# Patient Record
Sex: MALE | Race: WHITE | NOT HISPANIC OR LATINO | Employment: OTHER | URBAN - METROPOLITAN AREA
[De-identification: names, ages, dates, MRNs, and addresses within clinical notes are randomized per-mention and may not be internally consistent; named-entity substitution may affect disease eponyms.]

---

## 2021-04-09 ENCOUNTER — AMBULATORY - HEALTHEAST (OUTPATIENT)
Dept: SURGERY | Facility: CLINIC | Age: 56
End: 2021-04-09

## 2021-04-09 DIAGNOSIS — Z11.59 ENCOUNTER FOR SCREENING FOR OTHER VIRAL DISEASES: ICD-10-CM

## 2021-04-12 ENCOUNTER — RECORDS - HEALTHEAST (OUTPATIENT)
Dept: ADMINISTRATIVE | Facility: OTHER | Age: 56
End: 2021-04-12

## 2021-04-21 ENCOUNTER — RECORDS - HEALTHEAST (OUTPATIENT)
Dept: ADMINISTRATIVE | Facility: OTHER | Age: 56
End: 2021-04-21

## 2022-08-19 ENCOUNTER — TRANSFERRED RECORDS (OUTPATIENT)
Dept: HEALTH INFORMATION MANAGEMENT | Facility: CLINIC | Age: 57
End: 2022-08-19

## 2022-09-21 ENCOUNTER — TRANSFERRED RECORDS (OUTPATIENT)
Dept: HEALTH INFORMATION MANAGEMENT | Facility: CLINIC | Age: 57
End: 2022-09-21

## 2022-09-22 ENCOUNTER — REFERRAL (OUTPATIENT)
Dept: TRANSPLANT | Facility: CLINIC | Age: 57
End: 2022-09-22

## 2022-09-22 DIAGNOSIS — N18.5 CHRONIC KIDNEY DISEASE, STAGE V (H): ICD-10-CM

## 2022-09-22 DIAGNOSIS — Z01.818 PRE-TRANSPLANT EVALUATION FOR KIDNEY TRANSPLANT: ICD-10-CM

## 2022-09-22 DIAGNOSIS — N18.6 ESRD (END STAGE RENAL DISEASE) (H): Primary | ICD-10-CM

## 2022-09-22 DIAGNOSIS — I10 ESSENTIAL HYPERTENSION: ICD-10-CM

## 2022-09-22 DIAGNOSIS — Z87.891 HISTORY OF TOBACCO USE: ICD-10-CM

## 2022-09-22 DIAGNOSIS — E11.9 DIABETES MELLITUS, TYPE 2 (H): ICD-10-CM

## 2022-09-22 DIAGNOSIS — Z01.818 ENCOUNTER FOR PRE-TRANSPLANT EVALUATION FOR KIDNEY AND PANCREAS TRANSPLANT: ICD-10-CM

## 2022-09-22 NOTE — LETTER
October 25, 2022      Emmanuel Barnett  [address] [facility] [address] [facility] [address]      Dear Emmanuel,    Thank you for your interest in the Transplant Center at [facility]. We look forward to being a part of your care team and assisting you through the transplant process.    As we discussed, your transplant coordinator is Colleen Chow (Pancreas, Kidney).  You may call your coordinator at any time with questions or concerns. [phone].    Please complete the following.    1. Fill out and return the enclosed forms    Authorization for Electronic Communication    Authorization to Discuss Protected Health Information    Authorization for Release of Protected Health Information    2. Sign up for:    [employer], access to your electronic medical record (see enclosed pamphlet)    [email or web address] a transplant education website    You can use these tools to learn more about your transplant, communicate with your care team, and track your medical details      Sincerely,      Solid Organ Transplant  [facility]    cc: Referring Physician PCP

## 2022-10-03 ENCOUNTER — DOCUMENTATION ONLY (OUTPATIENT)
Dept: TRANSPLANT | Facility: CLINIC | Age: 57
End: 2022-10-03

## 2022-10-03 VITALS — HEIGHT: 68 IN | WEIGHT: 196.21 LBS | BODY MASS INDEX: 29.74 KG/M2

## 2022-10-03 NOTE — TELEPHONE ENCOUNTER
PCP: Iliana Anderson   Referring Provider: Fiorella Olsen  Referring Diagnosis: ESRD  Hx: DM Type 2, Htn    Is patient under the age of 65? yes  Is patient diabetic? yes  Is patient on insulin? yes  Was patient offered a pancreas transplant referral? yes    Is patient in a group home/assisted living? no  Does patient have a guardian? no    Referral intake process completed.  Patient is aware that after financial approval is received, medical records will be requested.   Patient confirmed for a callback from transplant coordinator on October 11, 2022. (within 2 weeks)  Tentative evaluation date November 17, 2022 (within 4 weeks) if appointment is virtual, does patient have capabilities of setting this up?     Confirmed coordinator will discuss evaluation process in more detail at the time of their call.   Patient is aware of the need to arrange age appropriate cancer screening, vaccinations, and dental care.  Reminded patient to complete questionnaire, complete medical records release, and review packet prior to evaluation visit .  Assessed patient for special needs (ie-wheelchair, assistance, guardian, and ):  uses a wheelchair or walker   Patient instructed to call [phone] with questions.     Patient gave verbal consent during intake call to obtain medical records and documents outside of [facility]:  yes

## 2022-10-05 ENCOUNTER — DOCUMENTATION ONLY (OUTPATIENT)
Dept: TRANSPLANT | Facility: CLINIC | Age: 57
End: 2022-10-05

## 2022-10-11 ENCOUNTER — DOCUMENTATION ONLY (OUTPATIENT)
Dept: TRANSPLANT | Facility: CLINIC | Age: 57
End: 2022-10-11

## 2022-10-11 NOTE — TELEPHONE ENCOUNTER
Reviewed pt's chart for pre-kidney/pancreas transplant evaluation planning. Pt lives in [place], [place]. Referred to our center by Dr. Olsen. Pt has ESRD due to type 2 diabetes and hypertension. Pt has been on dialysis for over 3 years. He was evaluated at [facility] in 2019 and was asked to lose weight (BMI was 38) and lower A1C. Pt is a type 2 diabetic, last A1C 15 in February 2022. Other hx includes diabetic foot ulcers, depression. He was referred to [facility] for transplant evaluation, but had an eventful last 2 years with health complications: 1/2021 admission for perianal abcess with necrotic tissue and soft tissue infection, requiring SNF stay, shortly after discharge he was admitted for pneumonia, then osteomyelitis of his foot 5/2021, and COVID 12/2021. Admitted 2/2022 after a fall resulting in large subdural hematoma with intraparenchymal hemorrhage, s/p left middle meningeal artery embolization 4/7/22; had followed with neurosurgery. Heart hx: NSTEMI with complete occlusion of LCx s/p PCI x1 in 2017; PCI x3 in March 2019, x2 in April 2019, continues to follow with cardiology through [facility].  Lung status: MICHEAL on CPAP. Surgical hx includes: as above, tonsillectomy/adenoidectomy. BMI 30 on 6/29/22.  Discussed BMI requirement for transplant with patient: yes. Has never had a colonoscopy.  Dental: encouraged to update. Pt is a smoker, does not consume alcohol, and dies not use recreational drugs. Pt is  w/ ADLs.  Pt lives alone and has support in his daughter and ex-wife following transplant.     I also introduced [employer] and asked pt to create an account and view pre-kidney transplant videos for review with me following evaluation. Confirmed STD 11/17.

## 2022-11-10 ENCOUNTER — TELEPHONE (OUTPATIENT)
Dept: TRANSPLANT | Facility: CLINIC | Age: 57
End: 2022-11-10

## 2022-11-10 NOTE — TELEPHONE ENCOUNTER
.VM message left reminding patient of upcoming PKE appointments at [facility] on 11/17/22 starting at 0730. Instructed patient may eat breakfast, take regularly scheduled medications and be accompanied by 1 adult visitor.   Contact information given for any further questions/concerns/need to reschedule.

## 2022-11-15 NOTE — PROGRESS NOTES
"[facility] Solid Organ Transplant  Outpatient MNT: Kidney Pancreas Transplant Evaluation    Current BMI: 30.86 (HT 67.5 in,  lbs/91 kg)  BMI is within recommendation of <35 for KP transplant    Frailty Assessment -- Frail (3/5 points)- unintentional wt loss, reduced , low activity level     Recommended Interventions to Address Frailty:  - Continue PT  - Foot wound needs to heal; better BG control & higher protein intake      Time Spent: 15 minutes  Visit Type: Initial   Referring Physician: Finger   Pt accompanied by: his friend, Yudith (RN)    History of previous txp: none   Dialysis: yes    Dialysis Info: HD 2/2019 6 am   Protein supplement: none     Nutrition Assessment  H/o DM II. Reports he has a CGM, but hasn't been wearing it much lately d/t many CTs/scans that require removal of it. Now he is checking his BG maybe 1-2x/day. He reports his A1c is higher than before d/t not taking insulin or managing DM as well. Last A1c 15.6 in Feb.     Pt has open foot wound (for a few years now) and report it is \"almost healed\".     - Appetite: baseline   - Food allergies/intolerances: no   - Meal prep & grocery shopping: pt does   - Previous RD education: yes  - Issues chewing or swallowing: no   - N/V/D/C: occasional diarrhea   - Food access concerns: somewhat; ?[facility] in the past     Vitamins, Supplements, Pertinent Meds: calcium with vit D  Herbal Medicines/Supplements: none     Edema: occasional FREDDY     Weight hx:   - pt reports he has lost 45 lbs x 1 year- unintentional- change in lifestyle VS hyperglycemia; ?stable now   - wt shows down ~20 lbs x 1 year per CE    Diet Recall  Breakfast None- baseline     Lunch S/w with bologna or ham    Dinner Homemade goulash; pork chop w/ green beans (frozen) + occasional potato    Snacks Occasional granola bar    Beverages 32 oz FR- water, milk (12-16 oz/day), soda (Coke1-3/day), occasional juice/Gatorade; Premier Protein (30 g/day) 1 daily at the most "   Alcohol None    Dining out 2x/week      Physical Activity  PT- started 1 month ago; has done in the past but more helpful this time around   Trying to walk more w/o walker  Walks from house to detached garage w/o walker  Walker x 2.5 years - balance- open sore on foot     Labs  In August, K wnl and Phos high     Nutrition Diagnosis  No nutrition diagnosis identified at this time.     Nutrition Intervention  Nutrition education provided:  Discussed sodium intake (low sodium foods and drinks, seasoning food without salt and tips for low sodium diet).  Reviewed K levels, elevated phos levels. Reviewed recommendation to eliminate milk & soda intake to help with phosphorus. He is also eating a fair amount of processed foods, which will also impact phos level.     Reviewed high A1c and how this is impacting wound healing. We discussed how high protein intake also helps w/ wound healing. Reviewed need for improvement in A1c.     Reviewed post txp diet guidelines in brief (will review in further detail post txp):  (1) Review of proper food safety measures d/t immunosuppressant therapy post-op and increased risk for food-borne illness    (2) Avoid the following post txp d/t risk for rejection, unknown effects on the organs, and/or potential interactions with immunosuppressants:  - Herbal, Chinese, holistic, chiropractic, natural, alternative medicines and supplements  - Detoxes and cleanses  - Weight loss pills  - Protein powders or other products with extracts or herbs (ie green tea extract)    (3) Med regimen and possible side effects    Patient Understanding: Pt verbalized understanding of education provided.  Expected Engagement: Fair  Follow-Up Plans: PRN     Nutrition Goals  No nutrition goals identified at this time     Rosa Leal, RD, LD, CCTD

## 2022-11-16 LAB
ABO/RH(D): NORMAL
ANTIBODY SCREEN: NEGATIVE
SPECIMEN EXPIRATION DATE: NORMAL

## 2022-11-17 ENCOUNTER — ALLIED HEALTH/NURSE VISIT (OUTPATIENT)
Dept: TRANSPLANT | Facility: CLINIC | Age: 57
End: 2022-11-17
Attending: NURSE PRACTITIONER
Payer: MEDICARE

## 2022-11-17 ENCOUNTER — APPOINTMENT (OUTPATIENT)
Dept: TRANSPLANT | Facility: CLINIC | Age: 57
End: 2022-11-17
Attending: INTERNAL MEDICINE
Payer: MEDICARE

## 2022-11-17 ENCOUNTER — LAB (OUTPATIENT)
Dept: LAB | Facility: CLINIC | Age: 57
End: 2022-11-17
Attending: NURSE PRACTITIONER
Payer: MEDICARE

## 2022-11-17 ENCOUNTER — ANCILLARY PROCEDURE (OUTPATIENT)
Dept: CARDIOLOGY | Facility: CLINIC | Age: 57
End: 2022-11-17
Attending: NURSE PRACTITIONER
Payer: MEDICARE

## 2022-11-17 ENCOUNTER — ANCILLARY PROCEDURE (OUTPATIENT)
Dept: GENERAL RADIOLOGY | Facility: CLINIC | Age: 57
End: 2022-11-17
Attending: NURSE PRACTITIONER
Payer: MEDICARE

## 2022-11-17 ENCOUNTER — DOCUMENTATION ONLY (OUTPATIENT)
Dept: TRANSPLANT | Facility: CLINIC | Age: 57
End: 2022-11-17

## 2022-11-17 VITALS
HEART RATE: 72 BPM | DIASTOLIC BLOOD PRESSURE: 69 MMHG | OXYGEN SATURATION: 99 % | WEIGHT: 200 LBS | SYSTOLIC BLOOD PRESSURE: 161 MMHG | BODY MASS INDEX: 30.86 KG/M2

## 2022-11-17 DIAGNOSIS — I10 ESSENTIAL HYPERTENSION: ICD-10-CM

## 2022-11-17 DIAGNOSIS — N18.6 ESRD (END STAGE RENAL DISEASE) (H): ICD-10-CM

## 2022-11-17 DIAGNOSIS — N18.5 CHRONIC KIDNEY DISEASE, STAGE V (H): ICD-10-CM

## 2022-11-17 DIAGNOSIS — E11.69 TYPE 2 DIABETES MELLITUS WITH OTHER SPECIFIED COMPLICATION, WITH LONG-TERM CURRENT USE OF INSULIN (H): ICD-10-CM

## 2022-11-17 DIAGNOSIS — E11.9 DIABETES MELLITUS, TYPE 2 (H): ICD-10-CM

## 2022-11-17 DIAGNOSIS — Z99.2 TYPE 2 DIABETES MELLITUS WITH CHRONIC KIDNEY DISEASE ON CHRONIC DIALYSIS, WITH LONG-TERM CURRENT USE OF INSULIN (H): ICD-10-CM

## 2022-11-17 DIAGNOSIS — Z01.818 PRE-TRANSPLANT EVALUATION FOR KIDNEY TRANSPLANT: ICD-10-CM

## 2022-11-17 DIAGNOSIS — Z01.818 ENCOUNTER FOR PRE-TRANSPLANT EVALUATION FOR KIDNEY AND PANCREAS TRANSPLANT: ICD-10-CM

## 2022-11-17 DIAGNOSIS — Z87.891 HISTORY OF TOBACCO USE: ICD-10-CM

## 2022-11-17 DIAGNOSIS — N18.6 TYPE 2 DIABETES MELLITUS WITH CHRONIC KIDNEY DISEASE ON CHRONIC DIALYSIS, WITH LONG-TERM CURRENT USE OF INSULIN (H): ICD-10-CM

## 2022-11-17 DIAGNOSIS — Z79.4 TYPE 2 DIABETES MELLITUS WITH CHRONIC KIDNEY DISEASE ON CHRONIC DIALYSIS, WITH LONG-TERM CURRENT USE OF INSULIN (H): ICD-10-CM

## 2022-11-17 DIAGNOSIS — E11.22 TYPE 2 DIABETES MELLITUS WITH CHRONIC KIDNEY DISEASE ON CHRONIC DIALYSIS, WITH LONG-TERM CURRENT USE OF INSULIN (H): ICD-10-CM

## 2022-11-17 DIAGNOSIS — Z79.4 TYPE 2 DIABETES MELLITUS WITH OTHER SPECIFIED COMPLICATION, WITH LONG-TERM CURRENT USE OF INSULIN (H): ICD-10-CM

## 2022-11-17 DIAGNOSIS — Z12.5 ENCOUNTER FOR SCREENING FOR MALIGNANT NEOPLASM OF PROSTATE: ICD-10-CM

## 2022-11-17 LAB
A1 AG RBC QL: POSITIVE
ABO/RH(D): NORMAL
ALBUMIN SERPL BCG-MCNC: 4.2 G/DL (ref 3.5–5.2)
ALP SERPL-CCNC: 161 U/L (ref 40–129)
ALT SERPL W P-5'-P-CCNC: 19 U/L (ref 10–50)
ANION GAP SERPL CALCULATED.3IONS-SCNC: 17 MMOL/L (ref 7–15)
ANTIBODY TITER IGM SCREEN: NEGATIVE
APTT PPP: 31 SECONDS (ref 22–38)
AST SERPL W P-5'-P-CCNC: 16 U/L (ref 10–50)
ATRIAL RATE - MUSE: 78 BPM
B IGG TITR SERPL: 32 {TITER}
B IGM TITR SERPL: 32 {TITER}
BASOPHILS # BLD AUTO: 0 10E3/UL (ref 0–0.2)
BASOPHILS NFR BLD AUTO: 1 %
BILIRUB SERPL-MCNC: 0.4 MG/DL
BUN SERPL-MCNC: 35.5 MG/DL (ref 8–23)
CALCIUM SERPL-MCNC: 9 MG/DL (ref 8.6–10)
CHLORIDE SERPL-SCNC: 90 MMOL/L (ref 98–107)
CREAT SERPL-MCNC: 4.32 MG/DL (ref 0.67–1.17)
DEPRECATED HCO3 PLAS-SCNC: 27 MMOL/L (ref 22–29)
DIASTOLIC BLOOD PRESSURE - MUSE: NORMAL MMHG
EOSINOPHIL # BLD AUTO: 0.1 10E3/UL (ref 0–0.7)
EOSINOPHIL NFR BLD AUTO: 1 %
ERYTHROCYTE [DISTWIDTH] IN BLOOD BY AUTOMATED COUNT: 13 % (ref 10–15)
FACTOR 2 INTERPRETATION: NORMAL
FACTOR V INTERPRETATION: NORMAL
GFR SERPL CREATININE-BSD FRML MDRD: 15 ML/MIN/1.73M2
GLUCOSE SERPL-MCNC: 261 MG/DL (ref 70–99)
HBA1C MFR BLD: 13.2 %
HBV CORE AB SERPL QL IA: NONREACTIVE
HBV SURFACE AB SERPL IA-ACNC: 30.98 M[IU]/ML
HBV SURFACE AB SERPL IA-ACNC: REACTIVE M[IU]/ML
HBV SURFACE AG SERPL QL IA: NONREACTIVE
HCT VFR BLD AUTO: 32.3 % (ref 40–53)
HCV AB SERPL QL IA: NONREACTIVE
HGB BLD-MCNC: 10.9 G/DL (ref 13.3–17.7)
HIV 1+2 AB+HIV1 P24 AG SERPL QL IA: NONREACTIVE
IMM GRANULOCYTES # BLD: 0.1 10E3/UL
IMM GRANULOCYTES NFR BLD: 1 %
INR PPP: 1.16 (ref 0.85–1.15)
INTERPRETATION ECG - MUSE: NORMAL
LAB DIRECTOR COMMENTS: NORMAL
LAB DIRECTOR DISCLAIMER: NORMAL
LAB DIRECTOR INTERPRETATION: NORMAL
LAB DIRECTOR METHODOLOGY: NORMAL
LAB DIRECTOR RESULTS: NORMAL
LVEF ECHO: NORMAL
LYMPHOCYTES # BLD AUTO: 1.2 10E3/UL (ref 0.8–5.3)
LYMPHOCYTES NFR BLD AUTO: 14 %
MCH RBC QN AUTO: 30 PG (ref 26.5–33)
MCHC RBC AUTO-ENTMCNC: 33.7 G/DL (ref 31.5–36.5)
MCV RBC AUTO: 89 FL (ref 78–100)
MONOCYTES # BLD AUTO: 0.6 10E3/UL (ref 0–1.3)
MONOCYTES NFR BLD AUTO: 7 %
NEUTROPHILS # BLD AUTO: 6.3 10E3/UL (ref 1.6–8.3)
NEUTROPHILS NFR BLD AUTO: 76 %
NRBC # BLD AUTO: 0 10E3/UL
NRBC BLD AUTO-RTO: 0 /100
P AXIS - MUSE: 53 DEGREES
PLATELET # BLD AUTO: 331 10E3/UL (ref 150–450)
POTASSIUM SERPL-SCNC: 4.3 MMOL/L (ref 3.4–5.3)
PR INTERVAL - MUSE: 204 MS
PROT SERPL-MCNC: 7.9 G/DL (ref 6.4–8.3)
PSA SERPL-MCNC: 0.73 NG/ML (ref 0–3.5)
QRS DURATION - MUSE: 86 MS
QT - MUSE: 418 MS
QTC - MUSE: 476 MS
R AXIS - MUSE: 11 DEGREES
RBC # BLD AUTO: 3.63 10E6/UL (ref 4.4–5.9)
SODIUM SERPL-SCNC: 134 MMOL/L (ref 136–145)
SPECIMEN DESCRIPTION: NORMAL
SPECIMEN EXPIRATION DATE: NORMAL
SYSTOLIC BLOOD PRESSURE - MUSE: NORMAL MMHG
T AXIS - MUSE: 37 DEGREES
VENTRICULAR RATE- MUSE: 78 BPM
WBC # BLD AUTO: 8.3 10E3/UL (ref 4–11)

## 2022-11-17 PROCEDURE — 87340 HEPATITIS B SURFACE AG IA: CPT | Performed by: PHYSICIAN ASSISTANT

## 2022-11-17 PROCEDURE — 99207 PR NO CHARGE COORDINATED CARE PS: CPT

## 2022-11-17 PROCEDURE — 86901 BLOOD TYPING SEROLOGIC RH(D): CPT | Performed by: PHYSICIAN ASSISTANT

## 2022-11-17 PROCEDURE — 86833 HLA CLASS II HIGH DEFIN QUAL: CPT | Performed by: PHYSICIAN ASSISTANT

## 2022-11-17 PROCEDURE — 93306 TTE W/DOPPLER COMPLETE: CPT | Performed by: INTERNAL MEDICINE

## 2022-11-17 PROCEDURE — 71046 X-RAY EXAM CHEST 2 VIEWS: CPT | Mod: GC | Performed by: RADIOLOGY

## 2022-11-17 PROCEDURE — 85025 COMPLETE CBC W/AUTO DIFF WBC: CPT | Performed by: PATHOLOGY

## 2022-11-17 PROCEDURE — 85730 THROMBOPLASTIN TIME PARTIAL: CPT | Performed by: PATHOLOGY

## 2022-11-17 PROCEDURE — 86905 BLOOD TYPING RBC ANTIGENS: CPT | Performed by: PHYSICIAN ASSISTANT

## 2022-11-17 PROCEDURE — G0103 PSA SCREENING: HCPCS | Mod: GA | Performed by: PATHOLOGY

## 2022-11-17 PROCEDURE — 86886 COOMBS TEST INDIRECT TITER: CPT | Performed by: PHYSICIAN ASSISTANT

## 2022-11-17 PROCEDURE — 81382 HLA II TYPING 1 LOC HR: CPT | Performed by: PHYSICIAN ASSISTANT

## 2022-11-17 PROCEDURE — 99205 OFFICE O/P NEW HI 60 MIN: CPT

## 2022-11-17 PROCEDURE — 80053 COMPREHEN METABOLIC PANEL: CPT | Performed by: PATHOLOGY

## 2022-11-17 PROCEDURE — 86147 CARDIOLIPIN ANTIBODY EA IG: CPT | Performed by: PHYSICIAN ASSISTANT

## 2022-11-17 PROCEDURE — 86706 HEP B SURFACE ANTIBODY: CPT | Performed by: PHYSICIAN ASSISTANT

## 2022-11-17 PROCEDURE — 86850 RBC ANTIBODY SCREEN: CPT | Performed by: PHYSICIAN ASSISTANT

## 2022-11-17 PROCEDURE — 86665 EPSTEIN-BARR CAPSID VCA: CPT | Performed by: PHYSICIAN ASSISTANT

## 2022-11-17 PROCEDURE — 36415 COLL VENOUS BLD VENIPUNCTURE: CPT | Performed by: PATHOLOGY

## 2022-11-17 PROCEDURE — 86780 TREPONEMA PALLIDUM: CPT | Performed by: PHYSICIAN ASSISTANT

## 2022-11-17 PROCEDURE — 86644 CMV ANTIBODY: CPT | Performed by: PHYSICIAN ASSISTANT

## 2022-11-17 PROCEDURE — 86803 HEPATITIS C AB TEST: CPT | Performed by: PHYSICIAN ASSISTANT

## 2022-11-17 PROCEDURE — 84681 ASSAY OF C-PEPTIDE: CPT | Performed by: PHYSICIAN ASSISTANT

## 2022-11-17 PROCEDURE — 85390 FIBRINOLYSINS SCREEN I&R: CPT | Mod: 26 | Performed by: PATHOLOGY

## 2022-11-17 PROCEDURE — 81240 F2 GENE: CPT | Performed by: PHYSICIAN ASSISTANT

## 2022-11-17 PROCEDURE — 86481 TB AG RESPONSE T-CELL SUSP: CPT | Performed by: PHYSICIAN ASSISTANT

## 2022-11-17 PROCEDURE — 85730 THROMBOPLASTIN TIME PARTIAL: CPT | Performed by: PHYSICIAN ASSISTANT

## 2022-11-17 PROCEDURE — 85670 THROMBIN TIME PLASMA: CPT | Performed by: PHYSICIAN ASSISTANT

## 2022-11-17 PROCEDURE — 85610 PROTHROMBIN TIME: CPT | Performed by: PATHOLOGY

## 2022-11-17 PROCEDURE — 83036 HEMOGLOBIN GLYCOSYLATED A1C: CPT | Performed by: PHYSICIAN ASSISTANT

## 2022-11-17 PROCEDURE — 86787 VARICELLA-ZOSTER ANTIBODY: CPT | Performed by: PHYSICIAN ASSISTANT

## 2022-11-17 PROCEDURE — 86704 HEP B CORE ANTIBODY TOTAL: CPT | Performed by: PHYSICIAN ASSISTANT

## 2022-11-17 PROCEDURE — 86832 HLA CLASS I HIGH DEFIN QUAL: CPT | Performed by: PHYSICIAN ASSISTANT

## 2022-11-17 PROCEDURE — G0452 MOLECULAR PATHOLOGY INTERPR: HCPCS | Mod: 26 | Performed by: PATHOLOGY

## 2022-11-17 RX ORDER — VIT B COMP NO.3/FOLIC/C/BIOTIN 1 MG-60 MG
1 TABLET ORAL
COMMUNITY

## 2022-11-17 RX ORDER — CALCIUM CARBONATE 500(1250)
TABLET,CHEWABLE ORAL
COMMUNITY

## 2022-11-17 RX ORDER — SERTRALINE HYDROCHLORIDE 150 MG/1
1 CAPSULE ORAL EVERY 24 HOURS
COMMUNITY
Start: 2022-02-17

## 2022-11-17 RX ORDER — INSULIN DEGLUDEC 100 U/ML
50 INJECTION, SOLUTION SUBCUTANEOUS
COMMUNITY
Start: 2021-04-23

## 2022-11-17 RX ORDER — PREGABALIN 50 MG/1
CAPSULE ORAL
COMMUNITY
Start: 2022-10-19

## 2022-11-17 RX ORDER — NITROGLYCERIN 0.4 MG/1
0.4 TABLET SUBLINGUAL
COMMUNITY
Start: 2022-06-08

## 2022-11-17 RX ORDER — TRAZODONE HYDROCHLORIDE 100 MG/1
100 TABLET ORAL
COMMUNITY
Start: 2021-05-06

## 2022-11-17 NOTE — PROGRESS NOTES
TRANSPLANT NEPHROLOGY RECIPIENT EVALUATION NOTE    Assessment and Plan:  # Kidney/Pancreas Transplant Evaluation: Patient is a fair candidate overall. Benefits of a living donor transplant were discussed.    # ESKD from Presumed Diabetic Nephropathy: doing OK on dialysis since February 2019, but may benefit from a kidney transplant.    # Type 2 Diabetes: currently using short acting insulin 5-12 units with meals and Tresiba 50 units daily. Poorly controlled, A1c 13.2%. His cardiac history may put him at an increased risk for pancreas transplant.     # Cardiac Risk: he will need risk assessment due to history of CAD s/p PCI in 2017, 2019, and Jan 2021 (see last [facility] copied below), HFrEF (40-45%), and moderate-to-severe mitral insufficiency.     # Subdural Hematoma: 2/2 mechanical fall, s/p left middle meningeal artery embolization April 2022. Need to ensure he has had adequate neurosurgery follow up.    # MICHEAL: on CPAP.    # Former Tobacco Use: 30+ pack-year history. Quit 2017. Needs PFTs.    # Abnormal CXR: partially visualized nodular opacity left apex. Will get non-contrast chest CT.     # Right Diabetic Foot Wound: followed by podiatry and appears to be healing well.     # Health Maintenance: will need to ensure colonoscopy and dental are UTD.    Discussed the risks and benefits of a transplant, including the risk of surgery and immunosuppression medications.  Patient's overall evaluation will be discussed in the Transplant Program's regular meeting with a final recommendation on the patients suitability for transplant to be made at that time.    Evaluation:  Emmanuel Barnett was seen in consultation at the request of Dr. Michael Christine for evaluation as a potential kidney/pancreas transplant recipient.    Reason for Visit:  Emmanuel Barnett is a 57 year old male with ESKD from DM/HTN, who presents for kidney/pancreas transplant evaluation.    History of Present Illness:         Kidney Disease Hx: ESKD from presumed  diabetic nephropathy. On HD since Feb 2019. [place] [facility] (details not known) and then multiple infectious issues/admissions in 2021 (perineal necrotizing fasciitis s/p debridement 1/2021, rehospitalized from 3/7 to 3/11 with pneumonia, Charcot joint surgery on his right foot in 5/2021, subsequent debridement of a hematoma in the right leg after an incident with his walker, and then suffered a fall resulting in SDH Feb 2022 requiring surgical intervention). Here today as he's interested in pancreas transplant due to difficult to control diabetes.    LUE AVF working OK. Still has some urine output, no sensation of incomplete bladder emptying.          Primary Nephrologist: Dr. Olsen       H/o Kidney Stones: No       H/o Recurrent/Frequent UTI: No         Diabetic Hx: Type 2        Diagnosis Date: 15+ years ago       Medication History: oral agents first, now on insulin. Short acting 5-12 units with meals and Tresiba 50 units daily       Diabetic Control: Poorly controlled (HbA1c >9%)   Last HbA1c: 13.2%       Hypoglycemic Unawareness: No         Cardiac/Vascular Disease Risk Factors:        Known CAD: Yes; most recent [facility] Jan 2021:    1/7/2021 coronary angiogram:  DIAGNOSTIC - CORONARY  * Right dominant coronary artery system with severe coronary calcification.  * The left main artery is normal.  * The LAD has 80% proximal stenosis just prior to the previous proximal stent, progressed since last study. The stents in the proximal and distal vessel are widely patent. The mid segment that is not stented has 50% stenosis, slightly worse than previous. Distal/apical vessel is small and diffusely diseased. D1 is smaller caliber with moderate diffuse disease, unchanged. D2 is small.  * The circumflex artery is normal proximally. There is a widely patent stent in the mid segment. OM1 has 40-50% proximal stenosis, unchanged. OM2 has a widely patent proximal stent overlapping with the Cx stent.  *  The RCA has several overlapping stents from the proximal to distal segment. There is mild restenosis proximally but then there is a chronic occlusion in the mid portion of the stented segment. There are collaterals from the LAD/septals.  INTERVENTION  * IVUS was used for vessel assessment and stent sizing. The mid LAD between the stents has moderate disease.  * Successful angioplasty and stenting (Synergy 3.5 x 20 mm drug eluting) of the proximal LAD. The stent was post-dilated with a 4.0 mm NC balloon at 20 perez. Good angiographic result.   * FFR was performed on the mid segment after the proximal segment intervention was completed. Baseline FFR was 0.87 and hyperemic (IV adenosine infusion) FFR was 0.82 indicating the disease in the mid segment is not hemodynamically significant.  HEMODYNAMICS         * LVEDP 22 mmHg. No significant gradient across the aortic valve.              Known PAD/Caludication Symptoms: Yes       Known Heart Failure: Decreased LVEF       Arrhythmia: No       Pulmonary Hypertension: No       Valvular Disease: Yes         Viral Serology Status       CMV IgG Antibody: Positive       EBV IgG Antibody: Positive         Volume Status/Weight:        Volume status: Euvolemic       Weight:  Acceptable BMI       BMI: Body mass index is 30.86 kg/m .         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 Can walk around [place] and feel OK, no chest pain or SOB. Has not needed nitroglycerin      Fatigue/Decreased Energy: [x] No [] Yes    Chest Pain or SOB with Exertion: [x] No [] Yes    Significant Weight Change: [x] No [] Yes    Nausea, Vomiting or Diarrhea: [x] No [] Yes    Fever, Sweats or Chills:  [x] No [] Yes    Leg Swelling [x] No [] Yes      Potential Living Kidney Donors: Not sure    Review of Systems:  A comprehensive review of systems was obtained and negative, except as noted in the HPI or PMH.    Past  Medical History:   Medical record was reviewed and PMH was discussed with patient and noted below.  Past Medical History:   Diagnosis Date     Diabetes (H) Type 2      Hypertension    ESKD  PAD  CAD  HFrEF  Mitral insufficiency  Former tobacco use  MICHEAL    Past Social History:   Past Surgical History:   Procedure Laterality Date     CARDIAC SURGERY      stents   AVF creation     Personal history of bleeding or anesthesia problems: No    Family History:  No famhx of kidney disease     Personal History:   Social History     Socioeconomic History     Marital status:      Spouse name: Not on file     Number of children: Not on file     Years of education: Not on file     Highest education level: Not on file   Occupational History     Not on file   Tobacco Use     Smoking status: Former     Packs/day: 1.50     Years: 15.00     Pack years: 22.50     Types: Cigarettes     Quit date:      Years since quittin.8     Smokeless tobacco: Never   Substance and Sexual Activity     Alcohol use: Not Currently     Drug use: Never     Sexual activity: Not on file   Other Topics Concern     Not on file   Social History Narrative     Not on file     Social Determinants of Health     Financial Resource Strain: Not on file   Food Insecurity: Not on file   Transportation Needs: Not on file   Physical Activity: Not on file   Stress: Not on file   Social Connections: Not on file   Intimate Partner Violence: Not on file   Housing Stability: Not on file       Allergies:  No Known Allergies    Medications:  Current Outpatient Medications   Medication Sig     atorvastatin (LIPITOR) 40 MG tablet [ATORVASTATIN (LIPITOR) 40 MG TABLET] Take 40 mg by mouth bedtime.      calcium carbonate 500 mg, elemental, 1250 (500 Ca) MG tablet chewable Take 1,500-2,000 mg by mouth     cholecalciferol 25 MCG (1000 UT) TABS Take 2,000 Units by mouth     gabapentin (NEURONTIN) 600 MG tablet Take 100 mg by mouth At Bedtime     glucose (BD GLUCOSE) 4 g  chewable tablet Take 4 g by mouth     insulin aspart (NOVOLOG PEN) 100 UNIT/ML pen Take 1 unit per 5 grams of carbs with meals (if carb count unknown take 15 units)     insulin degludec (TRESIBA FLEXTOUCH) 100 UNIT/ML pen Inject 50 Units Subcutaneous     lisinopril (PRINIVIL,ZESTRIL) 2.5 MG tablet [LISINOPRIL (PRINIVIL,ZESTRIL) 2.5 MG TABLET] Take 2.5 mg by mouth daily.     metoprolol (LOPRESSOR) 50 MG tablet [METOPROLOL (LOPRESSOR) 50 MG TABLET] Take 50 mg by mouth every morning.      multivitamin RENAL (RENAVITE RX/NEPHROVITE) 1 MG tablet Take 1 tablet by mouth daily (with breakfast)     nitroGLYcerin (NITROSTAT) 0.4 MG sublingual tablet Place 0.4 mg under the tongue     pregabalin (LYRICA) 50 MG capsule      Sertraline HCl 150 MG CAPS Take 1 capsule by mouth every 24 hours     traZODone (DESYREL) 100 MG tablet Take 100 mg by mouth     aspirin 81 mg chewable tablet [ASPIRIN 81 MG CHEWABLE TABLET]  (Patient not taking: Reported on 11/17/2022)     citalopram (CELEXA) 40 MG tablet [CITALOPRAM (CELEXA) 40 MG TABLET] Take 40 mg by mouth every morning.  (Patient not taking: Reported on 11/17/2022)     glipiZIDE (GLUCOTROL) 10 MG tablet [GLIPIZIDE (GLUCOTROL) 10 MG TABLET] Take 10 mg by mouth 2 (two) times a day. (Patient not taking: Reported on 11/17/2022)     metFORMIN (GLUCOPHAGE) 1000 MG tablet [METFORMIN (GLUCOPHAGE) 1000 MG TABLET] Take 1,000 mg by mouth 2 (two) times a day. (Patient not taking: Reported on 11/17/2022)     No current facility-administered medications for this visit.       Vitals:  BP (!) 161/69   Pulse 72   Wt 90.7 kg (200 lb)   SpO2 99%   BMI 30.86 kg/m      Exam:  GENERAL APPEARANCE: alert and no distress  HENT: mouth without ulcers or lesions  LYMPHATICS: no cervical or supraclavicular nodes  RESP: lungs clear to auscultation - no rales, rhonchi or wheezes  CV: regular rhythm, normal rate, no rub, no murmur  EDEMA: no LE edema bilaterally  ABDOMEN: soft, nondistended, nontender, bowel sounds  normal  MS: extremities normal - no gross deformities noted, no evidence of inflammation in joints, no muscle tenderness  SKIN: no rash    Results:   No results found for this or any previous visit (from the past 336 hour(s)).

## 2022-11-17 NOTE — LETTER
11/17/2022      RE: Emmanuel Barnett  [address] [facility] [id number]       TRANSPLANT NEPHROLOGY RECIPIENT EVALUATION NOTE    Assessment and Plan:  # Kidney/Pancreas Transplant Evaluation: Patient is a fair candidate overall. Benefits of a living donor transplant were discussed.    # ESKD from Presumed Diabetic Nephropathy: doing OK on dialysis since February 2019, but may benefit from a kidney transplant.    # Type 2 Diabetes: currently using short acting insulin 5-12 units with meals and Tresiba 50 units daily. Poorly controlled, A1c 13.2%. His cardiac history may put him at an increased risk for pancreas transplant.     # Cardiac Risk: he will need risk assessment due to history of CAD s/p PCI in 2017, 2019, and Jan 2021 (see last [facility] copied below), HFrEF (40-45%), and moderate-to-severe mitral insufficiency.     # Subdural Hematoma: 2/2 mechanical fall, s/p left middle meningeal artery embolization April 2022. Need to ensure he has had adequate neurosurgery follow up.    # MICHEAL: on CPAP.    # Former Tobacco Use: 30+ pack-year history. Quit 2017. Needs PFTs.    # Abnormal CXR: partially visualized nodular opacity left apex. Will get non-contrast chest CT.     # Right Diabetic Foot Wound: followed by podiatry and appears to be healing well.     # Health Maintenance: will need to ensure colonoscopy and dental are UTD.    Discussed the risks and benefits of a transplant, including the risk of surgery and immunosuppression medications.  Patient's overall evaluation will be discussed in the Transplant Program's regular meeting with a final recommendation on the patients suitability for transplant to be made at that time.    Evaluation:  Emmanuel Barnett was seen in consultation at the request of Dr. Michael Christine for evaluation as a potential kidney/pancreas transplant recipient.    Reason for Visit:  Emmanuel Barnett is a 57 year old male with ESKD from DM/HTN, who presents for kidney/pancreas transplant  evaluation.    History of Present Illness:         Kidney Disease Hx: ESKD from presumed diabetic nephropathy. On HD since Feb 2019. Previously eval'd at [facility], but on hold (details not known) and then multiple infectious issues/admissions in 2021 (perineal necrotizing fasciitis s/p debridement 1/2021, rehospitalized from 3/7 to 3/11 with pneumonia, Charcot joint surgery on his right foot in 5/2021, subsequent debridement of a hematoma in the right leg after an incident with his walker, and then suffered a fall resulting in SDH Feb 2022 requiring surgical intervention). Here today as he's interested in pancreas transplant due to difficult to control diabetes.    LUE AVF working OK. Still has some urine output, no sensation of incomplete bladder emptying.          Primary Nephrologist: Dr. Olsen       H/o Kidney Stones: No       H/o Recurrent/Frequent UTI: No         Diabetic Hx: Type 2        Diagnosis Date: 15+ years ago       Medication History: oral agents first, now on insulin. Short acting 5-12 units with meals and Tresiba 50 units daily       Diabetic Control: Poorly controlled (HbA1c >9%)   Last HbA1c: 13.2%       Hypoglycemic Unawareness: No         Cardiac/Vascular Disease Risk Factors:        Known CAD: Yes; most recent [facility] Jan 2021:    1/7/2021 coronary angiogram:  DIAGNOSTIC - CORONARY  * Right dominant coronary artery system with severe coronary calcification.  * The left main artery is normal.  * The LAD has 80% proximal stenosis just prior to the previous proximal stent, progressed since last study. The stents in the proximal and distal vessel are widely patent. The mid segment that is not stented has 50% stenosis, slightly worse than previous. Distal/apical vessel is small and diffusely diseased. D1 is smaller caliber with moderate diffuse disease, unchanged. D2 is small.  * The circumflex artery is normal proximally. There is a widely patent stent in the mid segment. OM1 has 40-50% proximal  stenosis, unchanged. OM2 has a widely patent proximal stent overlapping with the Cx stent.  * The RCA has several overlapping stents from the proximal to distal segment. There is mild restenosis proximally but then there is a chronic occlusion in the mid portion of the stented segment. There are collaterals from the LAD/septals.  INTERVENTION  * IVUS was used for vessel assessment and stent sizing. The mid LAD between the stents has moderate disease.  * Successful angioplasty and stenting (Synergy 3.5 x 20 mm drug eluting) of the proximal LAD. The stent was post-dilated with a 4.0 mm NC balloon at 20 perez. Good angiographic result.   * FFR was performed on the mid segment after the proximal segment intervention was completed. Baseline FFR was 0.87 and hyperemic (IV adenosine infusion) FFR was 0.82 indicating the disease in the mid segment is not hemodynamically significant.  HEMODYNAMICS         * LVEDP 22 mmHg. No significant gradient across the aortic valve.              Known PAD/Caludication Symptoms: Yes       Known Heart Failure: Decreased LVEF       Arrhythmia: No       Pulmonary Hypertension: No       Valvular Disease: Yes         Viral Serology Status       CMV IgG Antibody: Positive       EBV IgG Antibody: Positive         Volume Status/Weight:        Volume status: Euvolemic       Weight:  Acceptable BMI       BMI: Body mass index is 30.86 kg/m .         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 Can walk around [place] and feel OK, no chest pain or SOB. Has not needed nitroglycerin      Fatigue/Decreased Energy: [x] No [] Yes    Chest Pain or SOB with Exertion: [x] No [] Yes    Significant Weight Change: [x] No [] Yes    Nausea, Vomiting or Diarrhea: [x] No [] Yes    Fever, Sweats or Chills:  [x] No [] Yes    Leg Swelling [x] No [] Yes      Potential Living Kidney Donors: Not sure    Review of Systems:  A  comprehensive review of systems was obtained and negative, except as noted in the HPI or PMH.    Past Medical History:   Medical record was reviewed and PMH was discussed with patient and noted below.  Past Medical History:   Diagnosis Date     Diabetes (H) Type 2      Hypertension    ESKD  PAD  CAD  HFrEF  Mitral insufficiency  Former tobacco use  MICHEAL    Past Social History:   Past Surgical History:   Procedure Laterality Date     CARDIAC SURGERY      stents   AVF creation     Personal history of bleeding or anesthesia problems: No    Family History:  No famhx of kidney disease     Personal History:   Social History     Socioeconomic History     Marital status:      Spouse name: Not on file     Number of children: Not on file     Years of education: Not on file     Highest education level: Not on file   Occupational History     Not on file   Tobacco Use     Smoking status: Former     Packs/day: 1.50     Years: 15.00     Pack years: 22.50     Types: Cigarettes     Quit date:      Years since quittin.8     Smokeless tobacco: Never   Substance and Sexual Activity     Alcohol use: Not Currently     Drug use: Never     Sexual activity: Not on file   Other Topics Concern     Not on file   Social History Narrative     Not on file     Social Determinants of Health     Financial Resource Strain: Not on file   Food Insecurity: Not on file   Transportation Needs: Not on file   Physical Activity: Not on file   Stress: Not on file   Social Connections: Not on file   Intimate Partner Violence: Not on file   Housing Stability: Not on file       Allergies:  No Known Allergies    Medications:  Current Outpatient Medications   Medication Sig     atorvastatin (LIPITOR) 40 MG tablet [ATORVASTATIN (LIPITOR) 40 MG TABLET] Take 40 mg by mouth bedtime.      calcium carbonate 500 mg, elemental, 1250 (500 Ca) MG tablet chewable Take 1,500-2,000 mg by mouth     cholecalciferol 25 MCG (1000 UT) TABS Take 2,000 Units by mouth      gabapentin (NEURONTIN) 600 MG tablet Take 100 mg by mouth At Bedtime     glucose (BD GLUCOSE) 4 g chewable tablet Take 4 g by mouth     insulin aspart (NOVOLOG PEN) 100 UNIT/ML pen Take 1 unit per 5 grams of carbs with meals (if carb count unknown take 15 units)     insulin degludec (TRESIBA FLEXTOUCH) 100 UNIT/ML pen Inject 50 Units Subcutaneous     lisinopril (PRINIVIL,ZESTRIL) 2.5 MG tablet [LISINOPRIL (PRINIVIL,ZESTRIL) 2.5 MG TABLET] Take 2.5 mg by mouth daily.     metoprolol (LOPRESSOR) 50 MG tablet [METOPROLOL (LOPRESSOR) 50 MG TABLET] Take 50 mg by mouth every morning.      multivitamin RENAL (RENAVITE RX/NEPHROVITE) 1 MG tablet Take 1 tablet by mouth daily (with breakfast)     nitroGLYcerin (NITROSTAT) 0.4 MG sublingual tablet Place 0.4 mg under the tongue     pregabalin (LYRICA) 50 MG capsule      Sertraline HCl 150 MG CAPS Take 1 capsule by mouth every 24 hours     traZODone (DESYREL) 100 MG tablet Take 100 mg by mouth     aspirin 81 mg chewable tablet [ASPIRIN 81 MG CHEWABLE TABLET]  (Patient not taking: Reported on 11/17/2022)     citalopram (CELEXA) 40 MG tablet [CITALOPRAM (CELEXA) 40 MG TABLET] Take 40 mg by mouth every morning.  (Patient not taking: Reported on 11/17/2022)     glipiZIDE (GLUCOTROL) 10 MG tablet [GLIPIZIDE (GLUCOTROL) 10 MG TABLET] Take 10 mg by mouth 2 (two) times a day. (Patient not taking: Reported on 11/17/2022)     metFORMIN (GLUCOPHAGE) 1000 MG tablet [METFORMIN (GLUCOPHAGE) 1000 MG TABLET] Take 1,000 mg by mouth 2 (two) times a day. (Patient not taking: Reported on 11/17/2022)     No current facility-administered medications for this visit.       Vitals:  BP (!) 161/69   Pulse 72   Wt 90.7 kg (200 lb)   SpO2 99%   BMI 30.86 kg/m      Exam:  GENERAL APPEARANCE: alert and no distress  HENT: mouth without ulcers or lesions  LYMPHATICS: no cervical or supraclavicular nodes  RESP: lungs clear to auscultation - no rales, rhonchi or wheezes  CV: regular rhythm, normal rate,  no rub, no murmur  EDEMA: no LE edema bilaterally  ABDOMEN: soft, nondistended, nontender, bowel sounds normal  MS: extremities normal - no gross deformities noted, no evidence of inflammation in joints, no muscle tenderness  SKIN: no rash    Results:   No results found for this or any previous visit (from the past 336 hour(s)).          No name on file

## 2022-11-17 NOTE — PROGRESS NOTES
"Psychosocial Assessment For Kidney Transplantation  Patient Name/ Age: Emmanuel Barnett 57 year old   Medical Record #: [id number]  Duration of Interview:     30 min  Process:   Face-to-Face Interview                (counseling < 50%)   Present at Appointment: Emmanuel and Friend Yudith in person         : DEMETRIA Kaufman  Date:  2022        Type of transplant: Kidney     Donor type:      relative and friend   Prior Transplants:    No Status of Transplant:           Current Living Situation    Location:   [address] [place] [address]  With Whom: lives alone       Family/ Social Support:    Emmanuel lives in [place], [place] on his own. He has two adult children ([place] and [place]). He is  after a 20 year marriage (currently single). He reports having a large family and strong support system. His parents are  and he has existing siblings. He has two grand children.     He notes that he is unsure who his caregiver will be at this time(potentially a sibling), this will need to be clarified. His friend was present at appointment.  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 and considers this group of individuals to be like \"family.\"    available, helpful       Activities/ Functional Ability    Current Level: cane/walker, wheelchair and independent with ADL's     Transportation drives self       Vocational/Employment/Financial     Employment   disabled   Job Description      Income   SSDI   Insurance  BCBS and Medicare part A, B  JAMMIE is currently paying his part B premiums       At this time, patient can afford medication costs:  Yes  Private Insurance and Medicare       Medical Status    Current Mode of Treatment for ESRD Dialysis   Complications None       Behavioral    Tobacco Use No Chemical Dependency No   Emmanuel quit using tobacco in 2017.    Emmanuel reports that he does not use any " "substances and prides himself in being sober. He indicated that he comes from a \"family of alcoholics\" so chooses to not use any substances.      Psychiatric Impairment Yes   Emmanuel is on medication for his depression. He does not see a therapist but has in the past. He feels that most of his mental health symptoms are related to relational/health concerns. He is open to seeing a therapist again, if needed.     Reading Ability: Good  Education Level: Technical College Recent Legal History No      Coping Style/Strategies: Family        Ability to Adhere to Complex Medical Regime: Yes     Adherence History:        Education  _X_ Medicare  _X_ Rehabilitation  _X_ Donor issues  _X_ Community resources  _X_ Post discharge housing  _X_ Financial resources  _X_ Medical insurance options  _X_ Psych adjustment  _X_ Family adjustment  _X_ Health Care Directive Provided Education   Psychosocial Risks of Transplant Reviewed and Discussed:  _X_ Increased stress related to emotional,            family, social, employment or financial           situation  _X_ Effect on work and/or disability benefits  _X_ Effect on future health and life           insurance  _X_ Transplant outcome expectations may           not be met  _X_ Mental Health Risks: anxiety,           depression, PTSD, guilt, grief and           chronic fatigue     Notable Items:   None noted.       Final Evaluation/Assessment   Patient seemed to process information well. Appeared well informed, motivated and able to follow post transplant requirements. Behavior was appropriate during interview. Has adequate income and insurance coverage. Adequate social support. No major contraindications noted for transplant.  At this time patient appears to understand the risks and benefits of transplant.      Recommendation  Conditional  1. Clarify Caregiver for post transplant      Selection Criteria Met:  Plan for support No  Chemical Dependence Yes   Smoking Yes   Mental Health " Yes   Adequate Finances Yes    Signature: DEMETRIA Kaufman    Title: Clinical

## 2022-11-17 NOTE — PROGRESS NOTES
Transplant Surgery Consult Note    Medical record number: [id number]  YOB: 1965,   Consult requested  for evaluation of kidney and pancreas transplant candidacy.    Assessment and Recommendations: Mr. Barnett is a fair candidate for transplantation and has a good understanding of the risks and benefits of this approach to management of renal failure and diabetes. The following issues should be addressed prior to transplant:     Mr. Barnett has End stage renal failure due to diabetes mellitus type 2 whose condition is not expected to resolve, is expected to progress, and is expected to continue to develop related comorbid conditions.    Ultimately I am concerned that his cardiac risk will be prohibitive for pancreas transplant and he may benefit from kidney alone, especially LDKT.    Cardiology consult for cardiac risk stratification to be ordered: Yes  CT abdomen and pelvis without contrast to be ordered for assessment of vascular targets: Yes [should repeat even though had abd CT  to look at vessels and infections seen on prior]  Transplant listing labs ordered to include HLA, ABOx2, Cr, etc.  Dietician consult ordered: Yes  Social work consult ordered: Yes  Imaging reports reviewed:  CT abd  Radiology images reviewed: none avail  Recipient suitable to move forward with work up of living donors: Yes [Need cardiology risk assessment]  Podiatry to clear feet  Cardiology to discuss risk stratification  Workup donors.    The majority of our visit was spent in counselling, discussing the medical and surgical risks of living or  donor kidney and pancreas transplantation, either in a simultaneous or sequential fashion. I contrasted approximate wait time for SPK vs living vs  donor kidneys from normal (0-85%) or higher (%) kidney donor profile index (KDPI) donors and their associated outcomes. I would recommend this individual to consider kidneys from high KDPI donors. The reason for  this decision is best summarized as: decreased dialysis related morbidity/mortality, accepting lower kidney graft survival rates.  Access to transplant will be impacted by living donor availability and overall candidacy for SPK, as well as the influence of blood type and degree of sensitization. We discussed advantages of preemptive transplant as well as living donor kidney transplant, and graft and patient survival outcomes associated with these options. Potential surgical complications of kidney and pancreas transplantation include bleeding, clotting, infection, wound complications, anastomotic failure and other issues such as cardiac complications, pneumonia, deep venous thrombosis, pulmonary embolism, post transplant diabetes and death. We discussed the need for protocol biopsy of the kidney and the possible need for a ureteral stent (and subsequent removal). We discussed benefits and risks associated with different approaches to exocrine drainage of pancreatic secretions. We also discussed differences in the average length of stay, recovery process, and posttransplant lab and monitoring protocol. We discussed the risk of graft rejection and recurrent diabetic nephropathy in the setting of poor glycemic control. I emphasized the need for strict immunosuppression adherence and the potential for complications of immunosuppression such as skin cancer or lymphoma, as well as a very low but not zero risk of donor-derived disease transmission risks (infection, cancer). Mr. Barnett asked good questions and the patient's candidacy will be reviewed at our Multidisciplinary Selection Committee. Thank you for the opportunity to participate in Mr. Barnett's care.      Michael Christine MD, PhD  Associate Professor of Surgery      ---------------------------------------------------------------------------------------------------    HPI: Mr. Barnett has End stage renal failure due to diabetes mellitus type 2. The patient has had  diabetes for 15 years.     Insulin 60-70 U/day.    BS runs 200's    No hypoglycemic unawareness.      Complications of diabetes include:    Retinopathy:  Yes   Neuropathy: Yes   Gastroparesis:  No    The patient is on dialysis.    Has potential kidney donors:  Yes .  Interested in participation in paired exchange if a donor is willing: Doesn't know     2021 had SDH with long hospitalization.  Decub.  Foot infection.    Charcot foot.  Healing wound (almost gone - seeing podiatry next week.    Stents in heart placed on several occasions    CT abd 2021  IMPRESSION:   1.  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     2.  No undrained fluid collections.     3.  Inflammatory-appearing thickening of the lower rectum and upper anus (proctitis).     4.  No other significant findings.      The patient has the following pertinent history:       No    Yes  Dialysis:    []      [] via:       Blood Transfusion                  [x]      []  Number of units:   Most recently:  Pregnancy:    [x]      [] Number:       Previous Transplant:  [x]      [] Details:    Cancer    [x]      [] Comment:   Kidney stones   [x]      [] Comment:      Recurrent infections  []      [x]  Type:   Sepsis/decub with long hospitalization.               Bladder dysfunction  [x]      [] Cause:    Claudication   [x]      [] Distance:    Previous Amputation  [x]      [] Cause:     Chronic anticoagulation  [x]      [] Indication:  Restoration  []      []     Past Medical History:   Diagnosis Date     Diabetes (H) Type 2      Hypertension      Past Surgical History:   Procedure Laterality Date     CARDIAC SURGERY      stents     No family history on file.  Social History     Socioeconomic History     Marital status:      Spouse name: Not on file     Number of children: Not on file     Years of education: Not on file     Highest  education level: Not on file   Occupational History     Not on file   Tobacco Use     Smoking status: Former     Packs/day: 1.50     Years: 15.00     Pack years: 22.50     Types: Cigarettes     Quit date:      Years since quittin.8     Smokeless tobacco: Never   Substance and Sexual Activity     Alcohol use: Not Currently     Drug use: Never     Sexual activity: Not on file   Other Topics Concern     Not on file   Social History Narrative     Not on file     Social Determinants of Health     Financial Resource Strain: Not on file   Food Insecurity: Not on file   Transportation Needs: Not on file   Physical Activity: Not on file   Stress: Not on file   Social Connections: Not on file   Intimate Partner Violence: Not on file   Housing Stability: Not on file       ROS:   CONSTITUTIONAL:  No fevers or chills  EYES: negative for icterus  ENT:  negative for hearing loss, tinnitus and sore throat  RESPIRATORY:  negative for cough, sputum, dyspnea  CARDIOVASCULAR:  negative for chest pain   GASTROINTESTINAL:  negative for nausea, vomiting,  Constipation + occ diarrhea.  GENITOURINARY:  negative for incontinence, dysuria, bladder emptying problems  HEME:  No easy bruising  INTEGUMENT:  negative for rash and pruritus  NEURO:  Negative for headache, seizure disorder    Allergies:   No Known Allergies    Medications:  Prescription Medications as of 2022       Rx Number Disp Refills Start End Last Dispensed Date Next Fill Date Owning Pharmacy    atorvastatin (LIPITOR) 40 MG tablet    2014        Sig: [ATORVASTATIN (LIPITOR) 40 MG TABLET] Take 40 mg by mouth bedtime.     Class: Historical    Route: Oral    calcium carbonate 500 mg, elemental, 1250 (500 Ca) MG tablet chewable            Sig: Take 1,500-2,000 mg by mouth    Class: Historical    Route: Oral    cholecalciferol 25 MCG (1000 UT) TABS    3/2/2021        Sig: Take 2,000 Units by mouth    Class: Historical    Route: Oral    gabapentin (NEURONTIN) 600  "MG tablet    12/17/2014        Sig: Take 100 mg by mouth At Bedtime    Class: Historical    Route: Oral    glucose (BD GLUCOSE) 4 g chewable tablet            Sig: Take 4 g by mouth    Class: Historical    Route: Oral    insulin aspart (NOVOLOG PEN) 100 UNIT/ML pen    5/22/2021        Sig: Take 1 unit per 5 grams of carbs with meals (if carb count unknown take 15 units)    Class: Historical    insulin degludec (TRESIBA FLEXTOUCH) 100 UNIT/ML pen    4/23/2021        Sig: Inject 50 Units Subcutaneous    Class: Historical    Route: Subcutaneous    lisinopril (PRINIVIL,ZESTRIL) 2.5 MG tablet    2/28/2015        Sig: [LISINOPRIL (PRINIVIL,ZESTRIL) 2.5 MG TABLET] Take 2.5 mg by mouth daily.    Class: Historical    Route: Oral    metoprolol (LOPRESSOR) 50 MG tablet    12/17/2014        Sig: [METOPROLOL (LOPRESSOR) 50 MG TABLET] Take 50 mg by mouth every morning.     Class: Historical    Route: Oral    multivitamin RENAL (RENAVITE RX/NEPHROVITE) 1 MG tablet            Sig: Take 1 tablet by mouth daily (with breakfast)    Class: Historical    Route: Oral    nitroGLYcerin (NITROSTAT) 0.4 MG sublingual tablet    6/8/2022        Sig: Place 0.4 mg under the tongue    Class: Historical    Route: Sublingual    pregabalin (LYRICA) 50 MG capsule    10/19/2022        Class: Historical    Sertraline HCl 150 MG CAPS    2/17/2022        Sig: Take 1 capsule by mouth every 24 hours    Class: Historical    Route: Oral    traZODone (DESYREL) 100 MG tablet    5/6/2021        Sig: Take 100 mg by mouth    Class: Historical    Route: Oral    aspirin 81 mg chewable tablet    12/17/2014        Sig: [ASPIRIN 81 MG CHEWABLE TABLET]     Class: Historical          Exam:   Pulse:  [72] 72  BP: (161)/(69) 161/69  SpO2:  [99 %] 99 %   Estimated body mass index is 30.86 kg/m  as calculated from the following:    Height as of 10/3/22: 1.715 m (5' 7.5\").    Weight as of an earlier encounter on 11/17/22: 90.7 kg (200 lb).    Appearance: in no apparent " distress.   Skin: normal  Head and Neck: Normal, no rashes or jaundice  Respiratory: easy respirations, no audible wheezing.  Cardiovascular: RRR  Abdomen: rounded, No distention   Extremeties:  Edema, none  Neuro: grossly normal  Psyche:  Normal speech and mentation     Diagnostics:   No results found for this or any previous visit (from the past 672 hour(s)).  No results found for: CPRA

## 2022-11-17 NOTE — LETTER
2022         RE: Emmanuel Barnett  [address] [address]        Dear Colleague,    Thank you for referring your patient, Emmanuel Barnett, to the [facility]. Please see a copy of my visit note below.    Transplant Surgery Consult Note    Medical record number: [id number]  YOB: 1965,   Consult requested  for evaluation of kidney and pancreas transplant candidacy.    Assessment and Recommendations: Mr. Barnett is a fair candidate for transplantation and has a good understanding of the risks and benefits of this approach to management of renal failure and diabetes. The following issues should be addressed prior to transplant:     Mr. Barnett has End stage renal failure due to diabetes mellitus type 2 whose condition is not expected to resolve, is expected to progress, and is expected to continue to develop related comorbid conditions.    Ultimately I am concerned that his cardiac risk will be prohibitive for pancreas transplant and he may benefit from kidney alone, especially LDKT.    Cardiology consult for cardiac risk stratification to be ordered: Yes  CT abdomen and pelvis without contrast to be ordered for assessment of vascular targets: Yes [should repeat even though had abd CT  to look at vessels and infections seen on prior]  Transplant listing labs ordered to include HLA, ABOx2, Cr, etc.  Dietician consult ordered: Yes  Social work consult ordered: Yes  Imaging reports reviewed:  CT abd  Radiology images reviewed: none avail  Recipient suitable to move forward with work up of living donors: Yes [Need cardiology risk assessment]  Podiatry to clear feet  Cardiology to discuss risk stratification  Workup donors.    The majority of our visit was spent in counselling, discussing the medical and surgical risks of living or  donor kidney and pancreas transplantation, either in a simultaneous or sequential fashion. I contrasted approximate wait time  for SPK vs living vs  donor kidneys from normal (0-85%) or higher (%) kidney donor profile index (KDPI) donors and their associated outcomes. I would recommend this individual to consider kidneys from high KDPI donors. The reason for this decision is best summarized as: decreased dialysis related morbidity/mortality, accepting lower kidney graft survival rates.  Access to transplant will be impacted by living donor availability and overall candidacy for SPK, as well as the influence of blood type and degree of sensitization. We discussed advantages of preemptive transplant as well as living donor kidney transplant, and graft and patient survival outcomes associated with these options. Potential surgical complications of kidney and pancreas transplantation include bleeding, clotting, infection, wound complications, anastomotic failure and other issues such as cardiac complications, pneumonia, deep venous thrombosis, pulmonary embolism, post transplant diabetes and death. We discussed the need for protocol biopsy of the kidney and the possible need for a ureteral stent (and subsequent removal). We discussed benefits and risks associated with different approaches to exocrine drainage of pancreatic secretions. We also discussed differences in the average length of stay, recovery process, and posttransplant lab and monitoring protocol. We discussed the risk of graft rejection and recurrent diabetic nephropathy in the setting of poor glycemic control. I emphasized the need for strict immunosuppression adherence and the potential for complications of immunosuppression such as skin cancer or lymphoma, as well as a very low but not zero risk of donor-derived disease transmission risks (infection, cancer). Mr. Barnett asked good questions and the patient's candidacy will be reviewed at our Multidisciplinary Selection Committee. Thank you for the opportunity to participate in Mr. Barnett's care.      Michael FUENTES  MD Nanci, PhD  Associate Professor of Surgery      ---------------------------------------------------------------------------------------------------    HPI: Mr. Barnett has End stage renal failure due to diabetes mellitus type 2. The patient has had diabetes for 15 years.     Insulin 60-70 U/day.    BS runs 200's    No hypoglycemic unawareness.      Complications of diabetes include:    Retinopathy:  Yes   Neuropathy: Yes   Gastroparesis:  No    The patient is on dialysis.    Has potential kidney donors:  Yes .  Interested in participation in paired exchange if a donor is willing: Doesn't know     2021 had SDH with long hospitalization.  Decub.  Foot infection.    Charcot foot.  Healing wound (almost gone - seeing podiatry next week.    Stents in heart placed on several occasions    CT abd 2021  IMPRESSION:   1.  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     2.  No undrained fluid collections.     3.  Inflammatory-appearing thickening of the lower rectum and upper anus (proctitis).     4.  No other significant findings.      The patient has the following pertinent history:       No    Yes  Dialysis:    []      [] via:       Blood Transfusion                  [x]      []  Number of units:   Most recently:  Pregnancy:    [x]      [] Number:       Previous Transplant:  [x]      [] Details:    Cancer    [x]      [] Comment:   Kidney stones   [x]      [] Comment:      Recurrent infections  []      [x]  Type:   Sepsis/decub with long hospitalization.               Bladder dysfunction  [x]      [] Cause:    Claudication   [x]      [] Distance:    Previous Amputation  [x]      [] Cause:     Chronic anticoagulation  [x]      [] Indication:  [place]  []      []     Past Medical History:   Diagnosis Date     Diabetes (H) Type 2      Hypertension      Past Surgical History:   Procedure Laterality Date      CARDIAC SURGERY      stents     No family history on file.  Social History     Socioeconomic History     Marital status:      Spouse name: Not on file     Number of children: Not on file     Years of education: Not on file     Highest education level: Not on file   Occupational History     Not on file   Tobacco Use     Smoking status: Former     Packs/day: 1.50     Years: 15.00     Pack years: 22.50     Types: Cigarettes     Quit date:      Years since quittin.8     Smokeless tobacco: Never   Substance and Sexual Activity     Alcohol use: Not Currently     Drug use: Never     Sexual activity: Not on file   Other Topics Concern     Not on file   Social History Narrative     Not on file     Social Determinants of Health     Financial Resource Strain: Not on file   Food Insecurity: Not on file   Transportation Needs: Not on file   Physical Activity: Not on file   Stress: Not on file   Social Connections: Not on file   Intimate Partner Violence: Not on file   Housing Stability: Not on file       ROS:   CONSTITUTIONAL:  No fevers or chills  EYES: negative for icterus  ENT:  negative for hearing loss, tinnitus and sore throat  RESPIRATORY:  negative for cough, sputum, dyspnea  CARDIOVASCULAR:  negative for chest pain   GASTROINTESTINAL:  negative for nausea, vomiting,  Constipation + occ diarrhea.  GENITOURINARY:  negative for incontinence, dysuria, bladder emptying problems  HEME:  No easy bruising  INTEGUMENT:  negative for rash and pruritus  NEURO:  Negative for headache, seizure disorder    Allergies:   No Known Allergies    Medications:  Prescription Medications as of 2022       Rx Number Disp Refills Start End Last Dispensed Date Next Fill Date Owning Pharmacy    atorvastatin (LIPITOR) 40 MG tablet    2014        Sig: [ATORVASTATIN (LIPITOR) 40 MG TABLET] Take 40 mg by mouth bedtime.     Class: Historical    Route: Oral    calcium carbonate 500 mg, elemental, 1250 (500 Ca) MG tablet  chewable            Sig: Take 1,500-2,000 mg by mouth    Class: Historical    Route: Oral    cholecalciferol 25 MCG (1000 UT) TABS    3/2/2021        Sig: Take 2,000 Units by mouth    Class: Historical    Route: Oral    gabapentin (NEURONTIN) 600 MG tablet    12/17/2014        Sig: Take 100 mg by mouth At Bedtime    Class: Historical    Route: Oral    glucose (BD GLUCOSE) 4 g chewable tablet            Sig: Take 4 g by mouth    Class: Historical    Route: Oral    insulin aspart (NOVOLOG PEN) 100 UNIT/ML pen    5/22/2021        Sig: Take 1 unit per 5 grams of carbs with meals (if carb count unknown take 15 units)    Class: Historical    insulin degludec (TRESIBA FLEXTOUCH) 100 UNIT/ML pen    4/23/2021        Sig: Inject 50 Units Subcutaneous    Class: Historical    Route: Subcutaneous    lisinopril (PRINIVIL,ZESTRIL) 2.5 MG tablet    2/28/2015        Sig: [LISINOPRIL (PRINIVIL,ZESTRIL) 2.5 MG TABLET] Take 2.5 mg by mouth daily.    Class: Historical    Route: Oral    metoprolol (LOPRESSOR) 50 MG tablet    12/17/2014        Sig: [METOPROLOL (LOPRESSOR) 50 MG TABLET] Take 50 mg by mouth every morning.     Class: Historical    Route: Oral    multivitamin RENAL (RENAVITE RX/NEPHROVITE) 1 MG tablet            Sig: Take 1 tablet by mouth daily (with breakfast)    Class: Historical    Route: Oral    nitroGLYcerin (NITROSTAT) 0.4 MG sublingual tablet    6/8/2022        Sig: Place 0.4 mg under the tongue    Class: Historical    Route: Sublingual    pregabalin (LYRICA) 50 MG capsule    10/19/2022        Class: Historical    Sertraline HCl 150 MG CAPS    2/17/2022        Sig: Take 1 capsule by mouth every 24 hours    Class: Historical    Route: Oral    traZODone (DESYREL) 100 MG tablet    5/6/2021        Sig: Take 100 mg by mouth    Class: Historical    Route: Oral    aspirin 81 mg chewable tablet    12/17/2014        Sig: [ASPIRIN 81 MG CHEWABLE TABLET]     Class: Historical          Exam:   Pulse:  [72] 72  BP: (161)/(69)  "161/69  SpO2:  [99 %] 99 %   Estimated body mass index is 30.86 kg/m  as calculated from the following:    Height as of 10/3/22: 1.715 m (5' 7.5\").    Weight as of an earlier encounter on 11/17/22: 90.7 kg (200 lb).    Appearance: in no apparent distress.   Skin: normal  Head and Neck: Normal, no rashes or jaundice  Respiratory: easy respirations, no audible wheezing.  Cardiovascular: RRR  Abdomen: rounded, No distention   Extremeties:  Edema, none  Neuro: grossly normal  Psyche:  Normal speech and mentation     Diagnostics:   No results found for this or any previous visit (from the past 672 hour(s)).  No results found for: CPRA      Again, thank you for allowing me to participate in the care of your patient.        Sincerely,        No name on file    "

## 2022-11-17 NOTE — LETTER
11/17/2022         RE: Emmanuel Barnett  [address] [address]        Dear Colleague,    Thank you for referring your patient, Emmanuel Barnett, to the [facility]. Please see a copy of my visit note below.    TRANSPLANT NEPHROLOGY RECIPIENT EVALUATION NOTE    Assessment and Plan:  # Kidney/Pancreas Transplant Evaluation: Patient is a fair candidate overall. Benefits of a living donor transplant were discussed.    # ESKD from Presumed Diabetic Nephropathy: doing OK on dialysis since February 2019, but may benefit from a kidney transplant.    # Type 2 Diabetes: currently using short acting insulin 5-12 units with meals and Tresiba 50 units daily. Poorly controlled, A1c 13.2%. His cardiac history may put him at an increased risk for pancreas transplant.     # Cardiac Risk: he will need risk assessment due to history of CAD s/p PCI in 2017, 2019, and Jan 2021 (see last [facility] copied below), HFrEF (40-45%), and moderate-to-severe mitral insufficiency.     # Subdural Hematoma: 2/2 mechanical fall, s/p left middle meningeal artery embolization April 2022. Need to ensure he has had adequate neurosurgery follow up.    # MICHEAL: on CPAP.    # Former Tobacco Use: 30+ pack-year history. Quit 2017. Needs PFTs.    # Abnormal CXR: partially visualized nodular opacity left apex. Will get non-contrast chest CT.     # Right Diabetic Foot Wound: followed by podiatry and appears to be healing well.     # Health Maintenance: will need to ensure colonoscopy and dental are UTD.    Discussed the risks and benefits of a transplant, including the risk of surgery and immunosuppression medications.  Patient's overall evaluation will be discussed in the Transplant Program's regular meeting with a final recommendation on the patients suitability for transplant to be made at that time.    Evaluation:  Emmanuel Barnett was seen in consultation at the request of Dr. Michael Christine for evaluation as a potential  kidney/pancreas transplant recipient.    Reason for Visit:  mEmanuel Barnett is a 57 year old male with ESKD from DM/HTN, who presents for kidney/pancreas transplant evaluation.    History of Present Illness:         Kidney Disease Hx: ESKD from presumed diabetic nephropathy. On HD since Feb 2019. Previously eval'd at [facility] [place] [facility], but on hold (details not known) and then multiple infectious issues/admissions in 2021 (perineal necrotizing fasciitis s/p debridement 1/2021, rehospitalized from 3/7 to 3/11 with pneumonia, Charcot joint surgery on his right foot in 5/2021, subsequent debridement of a hematoma in the right leg after an incident with his walker, and then suffered a fall resulting in SDH Feb 2022 requiring surgical intervention). Here today as he's interested in pancreas transplant due to difficult to control diabetes.    BAMBI AVF working OK. Still has some urine output, no sensation of incomplete bladder emptying.          Primary Nephrologist: Dr. Olsen       H/o Kidney Stones: No       H/o Recurrent/Frequent UTI: No         Diabetic Hx: Type 2        Diagnosis Date: 15+ years ago       Medication History: oral agents first, now on insulin. Short acting 5-12 units with meals and Tresiba 50 units daily       Diabetic Control: Poorly controlled (HbA1c >9%)   Last HbA1c: 13.2%       Hypoglycemic Unawareness: No         Cardiac/Vascular Disease Risk Factors:        Known CAD: Yes; most recent [facility] Jan 2021:    1/7/2021 coronary angiogram:  DIAGNOSTIC - CORONARY  * Right dominant coronary artery system with severe coronary calcification.  * The left main artery is normal.  * The LAD has 80% proximal stenosis just prior to the previous proximal stent, progressed since last study. The stents in the proximal and distal vessel are widely patent. The mid segment that is not stented has 50% stenosis, slightly worse than previous. Distal/apical vessel is small and diffusely diseased. D1 is smaller caliber with moderate  diffuse disease, unchanged. D2 is small.  * The circumflex artery is normal proximally. There is a widely patent stent in the mid segment. OM1 has 40-50% proximal stenosis, unchanged. OM2 has a widely patent proximal stent overlapping with the Cx stent.  * The RCA has several overlapping stents from the proximal to distal segment. There is mild restenosis proximally but then there is a chronic occlusion in the mid portion of the stented segment. There are collaterals from the LAD/septals.  INTERVENTION  * IVUS was used for vessel assessment and stent sizing. The mid LAD between the stents has moderate disease.  * Successful angioplasty and stenting (Synergy 3.5 x 20 mm drug eluting) of the proximal LAD. The stent was post-dilated with a 4.0 mm NC balloon at 20 perez. Good angiographic result.   * FFR was performed on the mid segment after the proximal segment intervention was completed. Baseline FFR was 0.87 and hyperemic (IV adenosine infusion) FFR was 0.82 indicating the disease in the mid segment is not hemodynamically significant.  HEMODYNAMICS         * LVEDP 22 mmHg. No significant gradient across the aortic valve.              Known PAD/Caludication Symptoms: Yes       Known Heart Failure: Decreased LVEF       Arrhythmia: No       Pulmonary Hypertension: No       Valvular Disease: Yes         Viral Serology Status       CMV IgG Antibody: Positive       EBV IgG Antibody: Positive         Volume Status/Weight:        Volume status: Euvolemic       Weight:  Acceptable BMI       BMI: Body mass index is 30.86 kg/m .         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 Can walk around [place] and feel OK, no chest pain or SOB. Has not needed nitroglycerin      Fatigue/Decreased Energy: [x] No [] Yes    Chest Pain or SOB with Exertion: [x] No [] Yes    Significant Weight Change: [x] No [] Yes    Nausea, Vomiting or Diarrhea: [x]  No [] Yes    Fever, Sweats or Chills:  [x] No [] Yes    Leg Swelling [x] No [] Yes      Potential Living Kidney Donors: Not sure    Review of Systems:  A comprehensive review of systems was obtained and negative, except as noted in the HPI or PMH.    Past Medical History:   Medical record was reviewed and PMH was discussed with patient and noted below.  Past Medical History:   Diagnosis Date     Diabetes (H) Type 2      Hypertension    ESKD  PAD  CAD  HFrEF  Mitral insufficiency  Former tobacco use  MICHEAL    Past Social History:   Past Surgical History:   Procedure Laterality Date     CARDIAC SURGERY      stents   AVF creation     Personal history of bleeding or anesthesia problems: No    Family History:  No famhx of kidney disease     Personal History:   Social History     Socioeconomic History     Marital status:      Spouse name: Not on file     Number of children: Not on file     Years of education: Not on file     Highest education level: Not on file   Occupational History     Not on file   Tobacco Use     Smoking status: Former     Packs/day: 1.50     Years: 15.00     Pack years: 22.50     Types: Cigarettes     Quit date:      Years since quittin.8     Smokeless tobacco: Never   Substance and Sexual Activity     Alcohol use: Not Currently     Drug use: Never     Sexual activity: Not on file   Other Topics Concern     Not on file   Social History Narrative     Not on file     Social Determinants of Health     Financial Resource Strain: Not on file   Food Insecurity: Not on file   Transportation Needs: Not on file   Physical Activity: Not on file   Stress: Not on file   Social Connections: Not on file   Intimate Partner Violence: Not on file   Housing Stability: Not on file       Allergies:  No Known Allergies    Medications:  Current Outpatient Medications   Medication Sig     atorvastatin (LIPITOR) 40 MG tablet [ATORVASTATIN (LIPITOR) 40 MG TABLET] Take 40 mg by mouth bedtime.      calcium  carbonate 500 mg, elemental, 1250 (500 Ca) MG tablet chewable Take 1,500-2,000 mg by mouth     cholecalciferol 25 MCG (1000 UT) TABS Take 2,000 Units by mouth     gabapentin (NEURONTIN) 600 MG tablet Take 100 mg by mouth At Bedtime     glucose (BD GLUCOSE) 4 g chewable tablet Take 4 g by mouth     insulin aspart (NOVOLOG PEN) 100 UNIT/ML pen Take 1 unit per 5 grams of carbs with meals (if carb count unknown take 15 units)     insulin degludec (TRESIBA FLEXTOUCH) 100 UNIT/ML pen Inject 50 Units Subcutaneous     lisinopril (PRINIVIL,ZESTRIL) 2.5 MG tablet [LISINOPRIL (PRINIVIL,ZESTRIL) 2.5 MG TABLET] Take 2.5 mg by mouth daily.     metoprolol (LOPRESSOR) 50 MG tablet [METOPROLOL (LOPRESSOR) 50 MG TABLET] Take 50 mg by mouth every morning.      multivitamin RENAL (RENAVITE RX/NEPHROVITE) 1 MG tablet Take 1 tablet by mouth daily (with breakfast)     nitroGLYcerin (NITROSTAT) 0.4 MG sublingual tablet Place 0.4 mg under the tongue     pregabalin (LYRICA) 50 MG capsule      Sertraline HCl 150 MG CAPS Take 1 capsule by mouth every 24 hours     traZODone (DESYREL) 100 MG tablet Take 100 mg by mouth     aspirin 81 mg chewable tablet [ASPIRIN 81 MG CHEWABLE TABLET]  (Patient not taking: Reported on 11/17/2022)     citalopram (CELEXA) 40 MG tablet [CITALOPRAM (CELEXA) 40 MG TABLET] Take 40 mg by mouth every morning.  (Patient not taking: Reported on 11/17/2022)     glipiZIDE (GLUCOTROL) 10 MG tablet [GLIPIZIDE (GLUCOTROL) 10 MG TABLET] Take 10 mg by mouth 2 (two) times a day. (Patient not taking: Reported on 11/17/2022)     metFORMIN (GLUCOPHAGE) 1000 MG tablet [METFORMIN (GLUCOPHAGE) 1000 MG TABLET] Take 1,000 mg by mouth 2 (two) times a day. (Patient not taking: Reported on 11/17/2022)     No current facility-administered medications for this visit.       Vitals:  BP (!) 161/69   Pulse 72   Wt 90.7 kg (200 lb)   SpO2 99%   BMI 30.86 kg/m      Exam:  GENERAL APPEARANCE: alert and no distress  HENT: mouth without ulcers or  lesions  LYMPHATICS: no cervical or supraclavicular nodes  RESP: lungs clear to auscultation - no rales, rhonchi or wheezes  CV: regular rhythm, normal rate, no rub, no murmur  EDEMA: no LE edema bilaterally  ABDOMEN: soft, nondistended, nontender, bowel sounds normal  MS: extremities normal - no gross deformities noted, no evidence of inflammation in joints, no muscle tenderness  SKIN: no rash    Results:   No results found for this or any previous visit (from the past 336 hour(s)).          Again, thank you for allowing me to participate in the care of your patient.        Sincerely,        No name on file

## 2022-11-17 NOTE — NURSING NOTE
Chief Complaint   Patient presents with     Transplant Evaluation     Kidney/pancreas     Blood pressure (!) 161/69, pulse 72, weight 90.7 kg (200 lb), SpO2 99 %.    Pt reports that he did not take his BP meds last night.    Joseline Mtz, CMA

## 2022-11-17 NOTE — PROGRESS NOTES
Kidney, Pancreas Transplant Referral - 9/22/2022  Patient attended appointments accompanied by friend  Patient completed AM appointments with all [facility] providers.  Time and location of PM appointments reviewed with patient.  Patient instructed next contact from Transplant Coordinator will be following Selection Committee  Patient stated understanding  Patient given written copy of:       One Year Survivial Rates for Adult Kidney Transplant       Multiple Listing and Waiting Time Transfer      What do you Need to Know about a Kidney Transplant      What you Need to Know about a Pancreas Transplant      Adult Kidney Transplant  Receipt of Information for Organ Transplant Recipient form signed and faxed to HIM  KDPI form signed and faxed to HIM  Patient has not watched [facility] Pre Kidney Transplant videos 1&2.  Demonstrated accessing [facility] and watching Pre Kidney Transplant videos 1&2.        Summary    Team s concerns/comments:   1) Cardiac risk assessment  2) PAD assessment  3) R foot diabetic wound  4) Subdural hematoma  5) MICHEAL  6) Support/Identified caregiver  7) Depression  8) Diabetes control  9) Abnormal CXR      Candidacy category: Yellow    Action/Plan:   1) EKG, Echocardiogram today. Cardiology consult  2) A/P CT without contrast (repeat even with 2021 CT)  3) Following w Podiatry, appears to be healing well  4) Ensure Neurosurgery F/U  5) On CPAP  6) Patient to identify primary caregiver.  7) Stable on medication. Open to seeing therapist  8) A1c 13.2%. Needs better blood sugar control  9) Non contrast chest CT    Expected Selection Meeting Discussion: 11/23/22

## 2022-11-18 DIAGNOSIS — R93.89 ABNORMAL CXR: Primary | ICD-10-CM

## 2022-11-18 DIAGNOSIS — R91.8 OPACITY OF LUNG ON IMAGING STUDY: ICD-10-CM

## 2022-11-18 DIAGNOSIS — Z76.82 ORGAN TRANSPLANT CANDIDATE: ICD-10-CM

## 2022-11-18 LAB
C PEPTIDE SERPL-MCNC: 11.7 NG/ML (ref 0.9–6.9)
CMV IGG SERPL IA-ACNC: 0.81 U/ML
CMV IGG SERPL IA-ACNC: ABNORMAL
DRVVT SCREEN RATIO: 0.85
EBV VCA IGG SER IA-ACNC: >750 U/ML
EBV VCA IGG SER IA-ACNC: POSITIVE
LA PPP-IMP: NEGATIVE
LUPUS INTERPRETATION: ABNORMAL
PLATELET NEUTRALIZATION: -6 SECONDS
PTT 1:2 MIX: 40 SECONDS (ref 31–45)
PTT RATIO: 1.21
T PALLIDUM AB SER QL: NONREACTIVE
THROMBIN TIME: 16.1 SECONDS (ref 13–19)
VZV IGG SER QL IA: 2506 INDEX
VZV IGG SER QL IA: POSITIVE

## 2022-11-19 LAB
PROTOCOL CUTOFF: NORMAL
QUANTIFERON MITOGEN: 3.38 IU/ML
QUANTIFERON NIL TUBE: 0.01 IU/ML
QUANTIFERON TB1 TUBE: 0.01 IU/ML
QUANTIFERON TB2 TUBE: 0.01
SA 1 CELL: NORMAL
SA 1 TEST METHOD: NORMAL
SA 2 CELL: NORMAL
SA 2 TEST METHOD: NORMAL
SA1 HI RISK ABY: NORMAL
SA1 MOD RISK ABY: NORMAL
SA2 HI RISK ABY: NORMAL
SA2 MOD RISK ABY: NORMAL
UNACCEPTABLE ANTIGENS: NORMAL
UNOS CPRA: 0
ZZZSA 1  COMMENTS: NORMAL
ZZZSA 2 COMMENTS: NORMAL

## 2022-11-20 LAB
GAMMA INTERFERON BACKGROUND BLD IA-ACNC: 0.01 IU/ML
M TB IFN-G BLD-IMP: NEGATIVE
M TB IFN-G CD4+ BCKGRND COR BLD-ACNC: 3.37 IU/ML
MITOGEN IGNF BCKGRD COR BLD-ACNC: 0 IU/ML
MITOGEN IGNF BCKGRD COR BLD-ACNC: 0 IU/ML

## 2022-11-21 LAB
CARDIOLIPIN IGG SER IA-ACNC: <2 GPL-U/ML
CARDIOLIPIN IGG SER IA-ACNC: NEGATIVE
CARDIOLIPIN IGM SER IA-ACNC: <2 MPL-U/ML
CARDIOLIPIN IGM SER IA-ACNC: NEGATIVE

## 2022-11-22 PROBLEM — Z87.891 HISTORY OF TOBACCO USE: Status: ACTIVE | Noted: 2022-11-22

## 2022-11-22 PROBLEM — I10 ESSENTIAL HYPERTENSION: Status: ACTIVE | Noted: 2022-11-22

## 2022-11-22 PROBLEM — N18.6 ESRD (END STAGE RENAL DISEASE) (H): Status: ACTIVE | Noted: 2022-11-22

## 2022-11-22 LAB
A*: NORMAL
A*LOCUS SEROLOGIC EQUIVALENT: 2
A*LOCUS: NORMAL
A*SEROLOGIC EQUIVALENT: 3
ABTEST METHOD: NORMAL
B*: NORMAL
B*LOCUS SEROLOGIC EQUIVALENT: 7
B*LOCUS: NORMAL
B*SEROLOGIC EQUIVALENT: 44
BW-1: NORMAL
BW-2: NORMAL
C*: NORMAL
C*LOCUS SEROLOGIC EQUIVALENT: 7
C*LOCUS: NORMAL
C*SEROLOGIC EQUIVALENT: 15
DPA1*: NORMAL
DPA1*LOCUS: NORMAL
DPB1*: NORMAL
DPB1*LOCUS NMDP: NORMAL
DPB1*LOCUS: NORMAL
DPB1*NMDP: NORMAL
DQA1*LOCUS: NORMAL
DQB1*: NORMAL
DQB1*LOCUS SEROLOGIC EQUIVALENT: 5
DQB1*LOCUS: NORMAL
DQB1*SEROLOGIC EQUIVALENT: 6
DRB1*: NORMAL
DRB1*LOCUS SEROLOGIC EQUIVALENT: 15
DRB1*LOCUS: NORMAL
DRB1*SEROLOGIC EQUIVALENT: 16
DRB5*: NORMAL
DRB5*LOCUS SEROLOGIC EQUIVALENT: 51
DRB5*LOCUS: NORMAL
DRB5*SEROLOGIC EQUIVALENT: 51
DRSSO TEST METHOD: NORMAL

## 2022-11-23 ENCOUNTER — COMMITTEE REVIEW (OUTPATIENT)
Dept: TRANSPLANT | Facility: CLINIC | Age: 57
End: 2022-11-23

## 2022-11-23 NOTE — COMMITTEE REVIEW
Abdominal Committee Review Note     Evaluation Date:   Committee Review Date: 11/23/2022    Organ being evaluated for: Kidney/Pancreas    Transplant Phase: Referral  Transplant Status: Active    Transplant Coordinator: Colleen Chow  Transplant Surgeon:       Referring Physician: Fiorella Olsen    Primary Diagnosis:   Secondary Diagnosis:     Committee Review Members:  Concepción, Coleen Leal RD   Nephrology Jakob Kaiser MD   Pharmacist Marion Fernando, [facility]    - Clinical Leonela Keri Portillo, [facility]   Transplant Cecilia Beltran RN, Michael Christine MD, Cindy Delcid, RN, Madelaine Dalton, RN, Fadumo Awad, RN, Adrienne Celis, EL, Colleen Chow RN       Transplant Eligibility: Insulin-dependent diabetes mellitus, Irreversible chronic kidney disease treated w/dialysis or expected need for dialysis    Committee Review Decision: Needs Re-presentation    Relative Contraindications:     Absolute Contraindications: Severe, untreated coronary, cerebral, or peripheral vascular disease    Committee Chair Michael Christine MD verbally attested to the committee's decision.    Committee Discussion Details: Reviewed pt's medical status and evaluation results to date.  Patient is determined to be a fair candidate for kidney transplant, not a candidate for pancreas due to cardiac history. Dr. Christine recommends the following items be completed as part of the pt's evaluation:   1. Cardiac consult to assess his risks due to history of multiple PCI and mitral insufficiency.    2. Obtain records from neurosurgery for subdural hematoma.  3. Non-contrast abdominal/pelvic CT and iliac US for PAD assessment.   4. PFT's for smoking history  5. Chest CT to assess abnormal chest x-ray results.   Will not be listed at this time as they are on dialysis. They will be rediscussed once the above are complete for consideration of active status..

## 2022-12-08 ENCOUNTER — TELEPHONE (OUTPATIENT)
Dept: TRANSPLANT | Facility: CLINIC | Age: 57
End: 2022-12-08

## 2022-12-08 DIAGNOSIS — Z87.891 HISTORY OF TOBACCO USE: ICD-10-CM

## 2022-12-08 DIAGNOSIS — Z01.818 PRE-TRANSPLANT EVALUATION FOR KIDNEY TRANSPLANT: Primary | ICD-10-CM

## 2022-12-08 DIAGNOSIS — I10 ESSENTIAL HYPERTENSION: ICD-10-CM

## 2022-12-08 DIAGNOSIS — N18.6 END STAGE RENAL DISEASE (H): ICD-10-CM

## 2022-12-08 NOTE — TELEPHONE ENCOUNTER
Contacted patient to review outcome of selection committee meeting (See selection committee encounter).   Explained to patient that he/she needs to complete all components of the evaluation to be eligible for active status on the waiting list or to proceed with a live donor kidney transplant.   Reviewed next steps based on outcomes:   Patient will not be listed (patient is on dialysis and evaluation is not complete), patient will receive:    - An Evaluation Summary Letter indicating what is needed to complete evaluation-discussed with patient if they would like to have testing done with [facility] or locally  Confirmed with patient that on successful completion of outstanding components, patient is eligible for active status and they will receive a follow-up call.   Confirmed that patient has contact information for additional questions or concerns.   Spoke with Emmanuel about his transplant evaluation. He is not a candidate for pancreas transplant due to cardiac status. He understands the remainder of his eval will entail imaging, lung cancer screening, cardiology clearance and clearance from his neurosurgery post- SDH. He is agreeable to this plan.

## 2022-12-08 NOTE — LETTER
12/08/22        Emmanuel Barnett  [address] [address]        Dear Emmanuel,    It was a pleasure to see you recently for consideration of kidney and pancreas transplantation. Your pre-transplant evaluation results were reviewed at our Multidisciplinary Selection Committee. The committee is requesting the following items are completed before determining your candidacy for kidney transplant:    1. Cardiology consult to assess risks for transplant surgery due to history of multiple PCI and mitral insufficiency.   2. Obtain clearance from neurosurgery after subdural hematoma.  3. Non-contrast abdominal/pelvic CT and iliac US for assessment of blood vessels in your abdomen to plan for kidney transplant surgery  4. Lung cancer screening with pulmonary function testing and non-contrast chest CT.       You can schedule locally if you wish for items # 1 and 2. Please work with your regular providers to arrange for these locally.   If you wish to schedule any appointments at our clinic, please let your transplant coordinator know who will assist with scheduling.     Please notify your transplant coordinator when all of the above items are successfully completed, at this point your results will be reviewed at the Multidisciplinary Selection Committee for approval. If your results indicate that your health is adequate for transplant, you will be eligible to be changed to ACTIVE status on the wait list and also to proceed with a live kidney donor transplant in the event of an approved live donor.     Please have any potential live kidney donors register online at [facility] to initiate their evaluations through our program's living donor screening tool at [email or web address]. Please note that potential donors will get an e-mail response once they submit their form within 1-2 business days. Potential donors may call our Main Office Number at [phone] and ask to speak with a donor coordinator if they have  questions about the process. You will be notified by your transplant coordinator if a live donor has been approved for donation.     For any questions, please contact the Transplant Office at [phone].      Sincerely,    Colleen Chow RN BSN CCTN  Solid Organ Transplant  [facility]

## 2022-12-08 NOTE — LETTER
12/08/22      Emmanuel Barnett  [address] [address]      Dear Emmanuel,    It was a pleasure to see you recently for consideration of kidney and pancreas transplantation. Your pre-transplant evaluation results were reviewed at our Multidisciplinary Selection Committee. The committee is requesting the following items are completed before determining your candidacy for kidney transplant:    1. Cardiology consult to assess risks for transplant surgery due to history of multiple PCI and mitral insufficiency.   2. Obtain clearance from neurosurgery after subdural hematoma.  3. Non-contrast abdominal/pelvic CT and iliac US for assessment of blood vessels in your abdomen to plan for kidney transplant surgery  4. Lung cancer screening with pulmonary function testing and non-contrast chest CT.       You can schedule locally if you wish for items # 1 and 2. Please work with your regular providers to arrange for these locally.   If you wish to schedule any appointments at our clinic, please let your transplant coordinator know who will assist with scheduling.     Please notify your transplant coordinator when all of the above items are successfully completed, at this point your results will be reviewed at the Multidisciplinary Selection Committee for approval. If your results indicate that your health is adequate for transplant, you will be eligible to be changed to ACTIVE status on the wait list and also to proceed with a live kidney donor transplant in the event of an approved live donor.                         Please have any potential live kidney donors register online at [facility] to initiate their evaluations through our program's living donor screening tool at [email or web address]. Please note that potential donors will get an e-mail response once they submit their form within 1-2 business days. Potential donors may call our Main Office Number at [phone] and ask to speak with a donor  coordinator if they have questions about the process. You will be notified by your transplant coordinator if a live donor has been approved for donation.     For any questions, please contact the Transplant Office at [phone].      Sincerely,    Colleen Chow RN BSN CCTN  Solid Organ Transplant  [facility]

## 2023-03-08 ENCOUNTER — TRANSFERRED RECORDS (OUTPATIENT)
Dept: HEALTH INFORMATION MANAGEMENT | Facility: CLINIC | Age: 58
End: 2023-03-08

## 2023-03-08 ENCOUNTER — LAB REQUISITION (OUTPATIENT)
Dept: LAB | Facility: CLINIC | Age: 58
End: 2023-03-08

## 2023-03-08 LAB — HGB BLD-MCNC: 6.3 G/DL (ref 13.3–17.7)

## 2023-03-08 PROCEDURE — 85018 HEMOGLOBIN: CPT | Performed by: INTERNAL MEDICINE

## 2023-03-17 ENCOUNTER — DOCUMENTATION ONLY (OUTPATIENT)
Dept: TRANSPLANT | Facility: CLINIC | Age: 58
End: 2023-03-17
Payer: MEDICARE

## 2023-03-17 DIAGNOSIS — I73.9 PVD (PERIPHERAL VASCULAR DISEASE) (H): ICD-10-CM

## 2023-03-17 DIAGNOSIS — N18.6 END STAGE RENAL DISEASE (H): ICD-10-CM

## 2023-03-17 DIAGNOSIS — N18.5 CHRONIC KIDNEY DISEASE, STAGE V (H): ICD-10-CM

## 2023-03-17 DIAGNOSIS — Z01.818 PRE-TRANSPLANT EVALUATION FOR KIDNEY TRANSPLANT: ICD-10-CM

## 2023-03-17 DIAGNOSIS — E11.9 DIABETES MELLITUS, TYPE 2 (H): ICD-10-CM

## 2023-03-17 DIAGNOSIS — I25.10 CARDIOVASCULAR DISEASE: ICD-10-CM

## 2023-03-17 DIAGNOSIS — Z87.891 HISTORY OF TOBACCO USE: ICD-10-CM

## 2023-03-17 DIAGNOSIS — I10 ESSENTIAL HYPERTENSION: ICD-10-CM

## 2023-03-30 ENCOUNTER — LAB REQUISITION (OUTPATIENT)
Dept: LAB | Facility: CLINIC | Age: 58
End: 2023-03-30
Payer: MEDICARE

## 2023-03-30 DIAGNOSIS — R79.89 OTHER SPECIFIED ABNORMAL FINDINGS OF BLOOD CHEMISTRY: ICD-10-CM

## 2023-03-30 DIAGNOSIS — R74.01 ELEVATION OF LEVELS OF LIVER TRANSAMINASE LEVELS: ICD-10-CM

## 2023-04-03 LAB
ANION GAP SERPL CALCULATED.3IONS-SCNC: 16 MMOL/L (ref 7–15)
BUN SERPL-MCNC: 75.9 MG/DL (ref 8–23)
CALCIUM SERPL-MCNC: 7.9 MG/DL (ref 8.6–10)
CHLORIDE SERPL-SCNC: 97 MMOL/L (ref 98–107)
CREAT SERPL-MCNC: 5.92 MG/DL (ref 0.67–1.17)
DEPRECATED HCO3 PLAS-SCNC: 24 MMOL/L (ref 22–29)
GFR SERPL CREATININE-BSD FRML MDRD: 10 ML/MIN/1.73M2
GLUCOSE SERPL-MCNC: 99 MG/DL (ref 70–99)
HGB BLD-MCNC: 7.7 G/DL (ref 13.3–17.7)
POTASSIUM SERPL-SCNC: 5.1 MMOL/L (ref 3.4–5.3)
SODIUM SERPL-SCNC: 137 MMOL/L (ref 136–145)

## 2023-04-03 PROCEDURE — 36415 COLL VENOUS BLD VENIPUNCTURE: CPT | Performed by: EMERGENCY MEDICINE

## 2023-04-03 PROCEDURE — 85018 HEMOGLOBIN: CPT | Performed by: EMERGENCY MEDICINE

## 2023-04-03 PROCEDURE — 80048 BASIC METABOLIC PNL TOTAL CA: CPT | Performed by: EMERGENCY MEDICINE

## 2023-05-15 ENCOUNTER — TRANSFERRED RECORDS (OUTPATIENT)
Dept: HEALTH INFORMATION MANAGEMENT | Facility: CLINIC | Age: 58
End: 2023-05-15

## 2023-06-27 ENCOUNTER — ANCILLARY PROCEDURE (OUTPATIENT)
Dept: CT IMAGING | Facility: CLINIC | Age: 58
End: 2023-06-27
Attending: PHYSICIAN ASSISTANT
Payer: MEDICARE

## 2023-06-27 ENCOUNTER — ANCILLARY PROCEDURE (OUTPATIENT)
Dept: ULTRASOUND IMAGING | Facility: CLINIC | Age: 58
End: 2023-06-27
Attending: PHYSICIAN ASSISTANT
Payer: MEDICARE

## 2023-06-27 DIAGNOSIS — Z87.891 HISTORY OF TOBACCO USE: ICD-10-CM

## 2023-06-27 DIAGNOSIS — I73.9 PVD (PERIPHERAL VASCULAR DISEASE) (H): ICD-10-CM

## 2023-06-27 DIAGNOSIS — I10 ESSENTIAL HYPERTENSION: ICD-10-CM

## 2023-06-27 DIAGNOSIS — E11.9 DIABETES MELLITUS, TYPE 2 (H): ICD-10-CM

## 2023-06-27 DIAGNOSIS — Z01.818 PRE-TRANSPLANT EVALUATION FOR KIDNEY TRANSPLANT: ICD-10-CM

## 2023-06-27 DIAGNOSIS — N18.5 CHRONIC KIDNEY DISEASE, STAGE V (H): ICD-10-CM

## 2023-06-27 DIAGNOSIS — N18.6 END STAGE RENAL DISEASE (H): ICD-10-CM

## 2023-06-27 DIAGNOSIS — I25.10 CARDIOVASCULAR DISEASE: ICD-10-CM

## 2023-06-27 PROCEDURE — 93978 VASCULAR STUDY: CPT | Mod: GC | Performed by: RADIOLOGY

## 2023-06-27 PROCEDURE — 74176 CT ABD & PELVIS W/O CONTRAST: CPT | Mod: MG | Performed by: RADIOLOGY

## 2023-06-27 PROCEDURE — 71250 CT THORAX DX C-: CPT | Mod: MG | Performed by: RADIOLOGY

## 2023-06-27 PROCEDURE — G1010 CDSM STANSON: HCPCS | Performed by: RADIOLOGY

## 2023-07-06 ENCOUNTER — TEAM CONFERENCE (OUTPATIENT)
Dept: TRANSPLANT | Facility: CLINIC | Age: 58
End: 2023-07-06

## 2023-07-06 DIAGNOSIS — Z87.891 HISTORY OF TOBACCO USE: ICD-10-CM

## 2023-07-06 DIAGNOSIS — E11.9 DIABETES MELLITUS, TYPE 2 (H): ICD-10-CM

## 2023-07-06 DIAGNOSIS — N18.5 CHRONIC KIDNEY DISEASE, STAGE V (H): ICD-10-CM

## 2023-07-06 DIAGNOSIS — Z76.82 ORGAN TRANSPLANT CANDIDATE: ICD-10-CM

## 2023-07-06 DIAGNOSIS — I25.10 CARDIOVASCULAR DISEASE: ICD-10-CM

## 2023-07-06 DIAGNOSIS — N18.6 END STAGE RENAL DISEASE (H): ICD-10-CM

## 2023-07-06 DIAGNOSIS — Z01.818 PRE-TRANSPLANT EVALUATION FOR KIDNEY TRANSPLANT: ICD-10-CM

## 2023-07-06 DIAGNOSIS — I10 ESSENTIAL HYPERTENSION: ICD-10-CM

## 2023-07-06 DIAGNOSIS — I73.9 PVD (PERIPHERAL VASCULAR DISEASE) (H): ICD-10-CM

## 2023-07-06 PROCEDURE — 94729 DIFFUSING CAPACITY: CPT | Performed by: INTERNAL MEDICINE

## 2023-07-06 PROCEDURE — 94375 RESPIRATORY FLOW VOLUME LOOP: CPT | Performed by: INTERNAL MEDICINE

## 2023-07-06 PROCEDURE — 94726 PLETHYSMOGRAPHY LUNG VOLUMES: CPT | Performed by: INTERNAL MEDICINE

## 2023-07-06 NOTE — TELEPHONE ENCOUNTER
Image Review Meeting    ATTENDEES:Dr. Shanice Day, Dr. Zaid Bolanos, and Fadumo Awad, RN     IMAGES REVIEWED: CT abd/pelvis and iliac US from 6/27/23    DECISION: ok vasculature for kidney transplant. Consult with urology can be placed due to bladder wall thickening per committee review note 11/23/22    INCIDENTALS: No

## 2023-07-07 LAB
DLCOUNC-%PRED-PRE: 52 %
DLCOUNC-PRE: 13.34 ML/MIN/MMHG
DLCOUNC-PRED: 25.64 ML/MIN/MMHG
ERV-%PRED-PRE: 101 %
ERV-PRE: 1.2 L
ERV-PRED: 1.18 L
EXPTIME-PRE: 6.61 SEC
FEF2575-%PRED-PRE: 76 %
FEF2575-PRE: 2.03 L/SEC
FEF2575-PRED: 2.67 L/SEC
FEFMAX-%PRED-PRE: 95 %
FEFMAX-PRE: 8.32 L/SEC
FEFMAX-PRED: 8.67 L/SEC
FEV1-%PRED-PRE: 84 %
FEV1-PRE: 2.62 L
FEV1FEV6-PRE: 77 %
FEV1FEV6-PRED: 79 %
FEV1FVC-PRE: 77 %
FEV1FVC-PRED: 79 %
FEV1SVC-PRE: 71 %
FEV1SVC-PRED: 66 %
FIFMAX-PRE: 5.22 L/SEC
FRCPLETH-%PRED-PRE: 95 %
FRCPLETH-PRE: 3.17 L
FRCPLETH-PRED: 3.3 L
FVC-%PRED-PRE: 86 %
FVC-PRE: 3.38 L
FVC-PRED: 3.92 L
IC-%PRED-PRE: 72 %
IC-PRE: 2.46 L
IC-PRED: 3.39 L
RVPLETH-%PRED-PRE: 96 %
RVPLETH-PRE: 1.97 L
RVPLETH-PRED: 2.03 L
TLCPLETH-%PRED-PRE: 84 %
TLCPLETH-PRE: 5.63 L
TLCPLETH-PRED: 6.63 L
VA-%PRED-PRE: 94 %
VA-PRE: 5.68 L
VC-%PRED-PRE: 78 %
VC-PRE: 3.66 L
VC-PRED: 4.67 L

## 2023-07-20 ENCOUNTER — TELEPHONE (OUTPATIENT)
Dept: UROLOGY | Facility: CLINIC | Age: 58
End: 2023-07-20
Payer: MEDICARE

## 2023-07-20 NOTE — TELEPHONE ENCOUNTER
[facility] Call Center    Phone Message    May a detailed message be left on voicemail: yes     Reason for Call: Appointment Intake    Referring Provider Name: Shanice Day MD in  [facility]  Diagnosis and/or Symptoms: Bladder wall thickening/pre-kidney transplant evaluation  Chronic kidney disease, stage V (H) [N18.5]  Pre-transplant evaluation for kidney transplant [Z01.818]  Cardiovascular disease [I25.10]  Diabetes mellitus, type 2 (H) [E11.9]  End stage renal disease (H) [N18.6]  History of tobacco use [Z87.891]  Organ transplant candidate [Z76.82]  Essential hypertension [I10]    Writer unsure if it's just the bladder wall thickening with a urologist that needs to be scheduled for if he should see a nephrologist.   Please review and advise/call pt to schedule Thank you    Action Taken: Message routed to:  [facility] ([facility] Uro    Travel Screening: Not Applicable

## 2023-09-18 ENCOUNTER — TRANSFERRED RECORDS (OUTPATIENT)
Dept: HEALTH INFORMATION MANAGEMENT | Facility: CLINIC | Age: 58
End: 2023-09-18

## 2023-09-19 DIAGNOSIS — N18.6 ESRD (END STAGE RENAL DISEASE) (H): Primary | ICD-10-CM

## 2023-09-19 PROCEDURE — 94060 EVALUATION OF WHEEZING: CPT | Performed by: INTERNAL MEDICINE

## 2023-09-19 PROCEDURE — 94729 DIFFUSING CAPACITY: CPT | Performed by: INTERNAL MEDICINE

## 2023-09-19 PROCEDURE — 94726 PLETHYSMOGRAPHY LUNG VOLUMES: CPT | Performed by: INTERNAL MEDICINE

## 2023-09-20 NOTE — TELEPHONE ENCOUNTER
MEDICAL RECORDS REQUEST   [facility]  Urology Clinic  [address] [place] [address]  PHONE: [phone]  Fax: [phone]        FUTURE VISIT INFORMATION                                                   Emmanuel Barentt, : 1965 scheduled for future visit at [facility]    APPOINTMENT INFORMATION:  Date: 10/17/2023  Provider:  Lorna Osborne PA-C  Reason for Visit/Diagnosis: Bladder wall thickening/pre-kidney transplant evaluation    REFERRAL INFORMATION:  Referring provider:  Shanice Day MD in  [facility]      RECORDS REQUESTED FOR VISIT                                                     NOTES  STATUS/DETAILS   OFFICE NOTE from other specialist  yes   MEDICATION LIST  yes   LABS     URINALYSIS (UA)  yes   URINE CYTOLOGY  2023 -- CT AB PELVIS     PRE-VISIT CHECKLIST      Joint diagnostic appointment coordinated correctly          (ensure right order & amount of time) Yes   RECORD COLLECTION COMPLETE Yes

## 2023-09-22 LAB
DLCOUNC-%PRED-PRE: 43 %
DLCOUNC-PRE: 11.03 ML/MIN/MMHG
DLCOUNC-PRED: 25.6 ML/MIN/MMHG
ERV-%PRED-PRE: 41 %
ERV-PRE: 0.6 L
ERV-PRED: 1.44 L
EXPTIME-PRE: 7.69 SEC
FEF2575-%PRED-POST: 39 %
FEF2575-%PRED-PRE: 37 %
FEF2575-POST: 1.05 L/SEC
FEF2575-PRE: 1 L/SEC
FEF2575-PRED: 2.65 L/SEC
FEFMAX-%PRED-PRE: 64 %
FEFMAX-PRE: 5.59 L/SEC
FEFMAX-PRED: 8.65 L/SEC
FEV1-%PRED-PRE: 53 %
FEV1-PRE: 1.66 L
FEV1FEV6-PRE: 69 %
FEV1FEV6-PRED: 79 %
FEV1FVC-PRE: 69 %
FEV1FVC-PRED: 79 %
FEV1SVC-PRE: 64 %
FEV1SVC-PRED: 66 %
FIFMAX-PRE: 3.97 L/SEC
FRCPLETH-%PRED-PRE: 86 %
FRCPLETH-PRE: 2.87 L
FRCPLETH-PRED: 3.31 L
FVC-%PRED-PRE: 61 %
FVC-PRE: 2.4 L
FVC-PRED: 3.91 L
IC-%PRED-PRE: 68 %
IC-PRE: 1.98 L
IC-PRED: 2.87 L
RVPLETH-%PRED-PRE: 111 %
RVPLETH-PRE: 2.27 L
RVPLETH-PRED: 2.04 L
TLCPLETH-%PRED-PRE: 73 %
TLCPLETH-PRE: 4.85 L
TLCPLETH-PRED: 6.63 L
VA-%PRED-PRE: 69 %
VA-PRE: 4.17 L
VC-%PRED-PRE: 55 %
VC-PRE: 2.58 L
VC-PRED: 4.66 L

## 2023-09-26 ENCOUNTER — TELEPHONE (OUTPATIENT)
Dept: TRANSPLANT | Facility: CLINIC | Age: 58
End: 2023-09-26
Payer: MEDICARE

## 2023-09-26 DIAGNOSIS — N18.5 CHRONIC KIDNEY DISEASE, STAGE V (H): Primary | ICD-10-CM

## 2023-09-26 DIAGNOSIS — Z87.891 HISTORY OF TOBACCO USE: ICD-10-CM

## 2023-09-26 DIAGNOSIS — I10 ESSENTIAL HYPERTENSION: ICD-10-CM

## 2023-09-26 DIAGNOSIS — N18.6 END STAGE RENAL DISEASE (H): ICD-10-CM

## 2023-09-26 DIAGNOSIS — I25.10 CARDIOVASCULAR DISEASE: ICD-10-CM

## 2023-09-26 DIAGNOSIS — E11.9 DIABETES MELLITUS, TYPE 2 (H): ICD-10-CM

## 2023-09-26 DIAGNOSIS — Z01.818 PRE-TRANSPLANT EVALUATION FOR KIDNEY TRANSPLANT: ICD-10-CM

## 2023-09-26 DIAGNOSIS — Z76.82 ORGAN TRANSPLANT CANDIDATE: ICD-10-CM

## 2023-09-26 NOTE — TELEPHONE ENCOUNTER
Called to let pt know nephrology PA requested a pulmonary consult due to new restrictions on recent PFT. Pt stated that week he was struggling with fluid overload due to colonoscopy prep and felt like he couldn't breathe, which would effect the outcome. Will send note to nephrology PA about that. Otherwise, RNCC will order pulmonary consult just to make sure there aren't any issues so he can be cleared for transplant.

## 2023-09-27 DIAGNOSIS — N18.6 ESRD (END STAGE RENAL DISEASE) (H): Primary | ICD-10-CM

## 2023-09-29 ENCOUNTER — TELEPHONE (OUTPATIENT)
Dept: PULMONOLOGY | Facility: CLINIC | Age: 58
End: 2023-09-29
Payer: MEDICARE

## 2023-09-29 ENCOUNTER — PRE VISIT (OUTPATIENT)
Dept: UROLOGY | Facility: CLINIC | Age: 58
End: 2023-09-29
Payer: MEDICARE

## 2023-09-29 NOTE — TELEPHONE ENCOUNTER
Consult for bladder wall thickening (transplant candidate).  Records available in [facility].

## 2023-09-29 NOTE — TELEPHONE ENCOUNTER
Patient Contacted for the patient to call back and schedule the following:    Appointment type: CXR  Provider: ANYA  Return date: 12/6/23  Specialty phone number: [phone]  Additional appointment(s) needed: NA  Additonal Notes: PT requested call back later today.

## 2023-10-17 ENCOUNTER — PRE VISIT (OUTPATIENT)
Dept: UROLOGY | Facility: CLINIC | Age: 58
End: 2023-10-17

## 2023-10-17 ENCOUNTER — TELEPHONE (OUTPATIENT)
Dept: UROLOGY | Facility: CLINIC | Age: 58
End: 2023-10-17

## 2023-10-17 ENCOUNTER — VIRTUAL VISIT (OUTPATIENT)
Dept: UROLOGY | Facility: CLINIC | Age: 58
End: 2023-10-17
Attending: SURGERY
Payer: MEDICARE

## 2023-10-17 DIAGNOSIS — N32.89 BLADDER WALL THICKENING: Primary | ICD-10-CM

## 2023-10-17 DIAGNOSIS — N18.6 END STAGE RENAL DISEASE (H): ICD-10-CM

## 2023-10-17 DIAGNOSIS — Z76.82 ORGAN TRANSPLANT CANDIDATE: ICD-10-CM

## 2023-10-17 DIAGNOSIS — Z01.818 PRE-TRANSPLANT EVALUATION FOR KIDNEY TRANSPLANT: ICD-10-CM

## 2023-10-17 DIAGNOSIS — Z87.891 HISTORY OF TOBACCO USE: ICD-10-CM

## 2023-10-17 DIAGNOSIS — N18.5 CHRONIC KIDNEY DISEASE, STAGE V (H): ICD-10-CM

## 2023-10-17 PROCEDURE — 99203 OFFICE O/P NEW LOW 30 MIN: CPT | Mod: 95 | Performed by: PHYSICIAN ASSISTANT

## 2023-10-17 NOTE — PATIENT INSTRUCTIONS
UROLOGY CLINIC VISIT PATIENT INSTRUCTIONS    Nurse visit for urine sample and bladder scan (ultrasound) next available.     If you have any issues, questions or concerns in the meantime, do not hesitate to contact us at [phone] or via [employer].     It was a pleasure meeting with you today.  Thank you for allowing me and my team the privilege of caring for you today.  YOU are the reason we are here, and I truly hope we provided you with the excellent service you deserve.  Please let us know if there is anything else we can do for you so that we can be sure you are leaving completely satisfied with your care experience.

## 2023-10-17 NOTE — PROGRESS NOTES
Virtual Visit Details    Type of service:  Video Visit   Video Start Time:  2:02 PM  Video End Time: 2:13 PM    Originating Location (pt. Location): Other clinic    Distant Location (provider location):  Off-site  Platform used for Video Visit: Vero

## 2023-10-17 NOTE — LETTER
10/17/2023       RE: Emmanuel Barnett  [address] [facility] [address]     Dear Colleague,    Thank you for referring your patient, Emmanuel Barnett, to the [facility]OLOG[facility] [place] at [facility]. Please see a copy of my visit note below.      Name: Emmanuel Barnett    MRN: [id number]   YOB: 1965                 Chief Complaint:   Bladder wall thickening         Assessment and Plan:   58 year old male with ESRD from DM2 who requires urologic evaluation and clearance for bladder wall thickening which was reported on recent CT A/P w/o contrast. Most likely, this probably represents chronic bladder outlet obstruction from BPH. However, will plan to check UA/UC and PVR. If evidence for hematuria, plan for cystoscopy given his smoking history. If PVR elevated, consider starting Flomax and rechecking PVR. If remains elevated on recheck, consider urodynamics.     -Nurse visit for UA/UC and PVR next available.  -Follow up pending the above.    Lorna Osborne PA-C  October 17, 2023          History of Present Illness:   Emmanuel Barnett is a 58 year old male with ESRD secondary to DM2, HTN, and CAD seen today via virtual visit in consultation from Dr. Day for evaluation of bladder wall thickening. He requires urology evaluation and clearance prior to kidney transplant. Bladder wall thickening was reported on recent CT abdomen/pelvis without contrast on 6/27/2023. He was also noted to have mild prostatic hypertrophy.     Today, Emmanuel reports that he is voiding well. He denies any gross hematuria or dysuria. Also denies urinary frequency, urgency, urinary incontinence, or obstructive voiding symptoms. He feels to empty his bladder well. He did have a possible UTI in late August that was associated with urinary frequency and urgency for which he was treated with antibiotics and symptoms resolved. UA in Care Everywhere looks consistent with  UTI but no urine culture was done. No other recent history of UTI.    Notably, he has a 22.5 pack year smoking history. Quit in 2017.         Past Medical History:     Past Medical History:   Diagnosis Date    Diabetes (H) Type 2     Hypertension             Past Surgical History:     Past Surgical History:   Procedure Laterality Date    CARDIAC SURGERY      stents            Social History:     Social History     Tobacco Use    Smoking status: Former     Packs/day: 1.50     Years: 15.00     Additional pack years: 0.00     Total pack years: 22.50     Types: Cigarettes     Quit date: 2017     Years since quittin.7    Smokeless tobacco: Never   Substance Use Topics    Alcohol use: Not Currently            Family History:   No family history on file.         Allergies:   No Known Allergies         Medications:     Current Outpatient Medications   Medication Sig    aspirin 81 mg chewable tablet [ASPIRIN 81 MG CHEWABLE TABLET]  (Patient not taking: Reported on 2022)    atorvastatin (LIPITOR) 40 MG tablet [ATORVASTATIN (LIPITOR) 40 MG TABLET] Take 40 mg by mouth bedtime.     calcium carbonate 500 mg, elemental, 1250 (500 Ca) MG tablet chewable Take 1,500-2,000 mg by mouth    cholecalciferol 25 MCG (1000 UT) TABS Take 2,000 Units by mouth    gabapentin (NEURONTIN) 600 MG tablet Take 100 mg by mouth At Bedtime    glucose (BD GLUCOSE) 4 g chewable tablet Take 4 g by mouth    insulin aspart (NOVOLOG PEN) 100 UNIT/ML pen Take 1 unit per 5 grams of carbs with meals (if carb count unknown take 15 units)    insulin degludec (TRESIBA FLEXTOUCH) 100 UNIT/ML pen Inject 50 Units Subcutaneous    lisinopril (PRINIVIL,ZESTRIL) 2.5 MG tablet [LISINOPRIL (PRINIVIL,ZESTRIL) 2.5 MG TABLET] Take 2.5 mg by mouth daily.    metoprolol (LOPRESSOR) 50 MG tablet [METOPROLOL (LOPRESSOR) 50 MG TABLET] Take 50 mg by mouth every morning.     multivitamin RENAL (RENAVITE RX/NEPHROVITE) 1 MG tablet Take 1 tablet by mouth daily (with breakfast)     nitroGLYcerin (NITROSTAT) 0.4 MG sublingual tablet Place 0.4 mg under the tongue    pregabalin (LYRICA) 50 MG capsule     Sertraline HCl 150 MG CAPS Take 1 capsule by mouth every 24 hours    traZODone (DESYREL) 100 MG tablet Take 100 mg by mouth     No current facility-administered medications for this visit.          Physical Exam:   GENERAL: Healthy, alert and no distress  EYES: Eyes grossly normal to inspection.  No discharge or erythema, or obvious scleral/conjunctival abnormalities.  RESP: No audible wheeze, cough, or visible cyanosis.  No visible retractions or increased work of breathing.    SKIN: Visible skin clear. No significant rash, abnormal pigmentation or lesions.  NEURO: Cranial nerves grossly intact.  Mentation and speech appropriate for age.  PSYCH: Mentation appears normal, affect normal/bright, judgement and insight intact, normal speech and appearance well-groomed.       Labs:    8/30/2023 - UA: >300 protein, >1000 glucose, small blood, large LE, >100 WBC, 11-25 RBC, moderate bacteria (no urine culture)    9/8/2023 - serum Cr: 3.05    Prostate Specific Antigen Screen   Date Value Ref Range Status   11/17/2022 0.73 0.00 - 3.50 ng/mL Final         Imaging:    EXAM: CT ABDOMEN PELVIS W/O CONTRAST  LOCATION: [facility]  DATE: 6/27/2023    FINDINGS:   LOWER CHEST: Septal thickening and mild scarring both lower lung fields. Coronary artery calcifications.     HEPATOBILIARY: Hepatomegaly measuring 20 cm in length.     PANCREAS: Normal.     SPLEEN: Normal.     ADRENAL GLANDS: Normal.     KIDNEYS/BLADDER: Mild bilateral renal cortical atrophy and mild perinephric stranding. Extensive vascular calcifications both renal uvaldo. No definite stones. No hydronephrosis. Diffuse bladder wall thickening, incompletely distended.     BOWEL: Diverticula sigmoid colon, without evidence for diverticulitis or bowel obstruction.     LYMPH NODES: Normal.     VASCULATURE:  Severe atherosclerotic disease abdominal aorta and iliac arteries. Plaque in all of the mesenteric arteries and at the origins of both renal arteries. Dense calcification of the splenic artery. Advanced peripheral vascular disease both groin   regions.     PELVIC ORGANS: Mild prostatic hypertrophy.     MUSCULOSKELETAL: Normal.                                                                      IMPRESSION:   1.  Severe atherosclerotic disease.  2.  Mild bilateral renal cortical atrophy and perinephric stranding.  3.  Sigmoid diverticulosis.  4.  Diffuse bladder wall thickening may represent hypertrophy from outlet obstruction secondary to an enlarged prostate gland.           30 minutes spent on the date of the encounter doing chart review, review of outside records, review of test results, interpretation of tests, patient visit, and documentation      Virtual Visit Details    Type of service:  Video Visit   Video Start Time:  2:02 PM  Video End Time: 2:13 PM    Originating Location (pt. Location): Other clinic    Distant Location (provider location):  Off-site  Platform used for Video Visit: Vero

## 2023-10-17 NOTE — PROGRESS NOTES
Name: Emmanuel Barnett    MRN: [id number]   YOB: 1965                 Chief Complaint:   Bladder wall thickening         Assessment and Plan:   58 year old male with ESRD from DM2 who requires urologic evaluation and clearance for bladder wall thickening which was reported on recent CT A/P w/o contrast. Most likely, this probably represents chronic bladder outlet obstruction from BPH. However, will plan to check UA/UC and PVR. If evidence for hematuria, plan for cystoscopy given his smoking history. If PVR elevated, consider starting Flomax and rechecking PVR. If remains elevated on recheck, consider urodynamics.     -Nurse visit for UA/UC and PVR next available.  -Follow up pending the above.    Lorna Osborne PA-C  October 17, 2023          History of Present Illness:   Emmanuel Barnett is a 58 year old male with ESRD secondary to DM2, HTN, and CAD seen today via virtual visit in consultation from Dr. Day for evaluation of bladder wall thickening. He requires urology evaluation and clearance prior to kidney transplant. Bladder wall thickening was reported on recent CT abdomen/pelvis without contrast on 6/27/2023. He was also noted to have mild prostatic hypertrophy.     Today, Emmanuel reports that he is voiding well. He denies any gross hematuria or dysuria. Also denies urinary frequency, urgency, urinary incontinence, or obstructive voiding symptoms. He feels to empty his bladder well. He did have a possible UTI in late August that was associated with urinary frequency and urgency for which he was treated with antibiotics and symptoms resolved. UA in Care Everywhere looks consistent with UTI but no urine culture was done. No other recent history of UTI.    Notably, he has a 22.5 pack year smoking history. Quit in 2017.         Past Medical History:     Past Medical History:   Diagnosis Date    Diabetes (H) Type 2     Hypertension             Past Surgical History:     Past Surgical  History:   Procedure Laterality Date    CARDIAC SURGERY      stents            Social History:     Social History     Tobacco Use    Smoking status: Former     Packs/day: 1.50     Years: 15.00     Additional pack years: 0.00     Total pack years: 22.50     Types: Cigarettes     Quit date:      Years since quittin.7    Smokeless tobacco: Never   Substance Use Topics    Alcohol use: Not Currently            Family History:   No family history on file.         Allergies:   No Known Allergies         Medications:     Current Outpatient Medications   Medication Sig    aspirin 81 mg chewable tablet [ASPIRIN 81 MG CHEWABLE TABLET]  (Patient not taking: Reported on 2022)    atorvastatin (LIPITOR) 40 MG tablet [ATORVASTATIN (LIPITOR) 40 MG TABLET] Take 40 mg by mouth bedtime.     calcium carbonate 500 mg, elemental, 1250 (500 Ca) MG tablet chewable Take 1,500-2,000 mg by mouth    cholecalciferol 25 MCG (1000 UT) TABS Take 2,000 Units by mouth    gabapentin (NEURONTIN) 600 MG tablet Take 100 mg by mouth At Bedtime    glucose (BD GLUCOSE) 4 g chewable tablet Take 4 g by mouth    insulin aspart (NOVOLOG PEN) 100 UNIT/ML pen Take 1 unit per 5 grams of carbs with meals (if carb count unknown take 15 units)    insulin degludec (TRESIBA FLEXTOUCH) 100 UNIT/ML pen Inject 50 Units Subcutaneous    lisinopril (PRINIVIL,ZESTRIL) 2.5 MG tablet [LISINOPRIL (PRINIVIL,ZESTRIL) 2.5 MG TABLET] Take 2.5 mg by mouth daily.    metoprolol (LOPRESSOR) 50 MG tablet [METOPROLOL (LOPRESSOR) 50 MG TABLET] Take 50 mg by mouth every morning.     multivitamin RENAL (RENAVITE RX/NEPHROVITE) 1 MG tablet Take 1 tablet by mouth daily (with breakfast)    nitroGLYcerin (NITROSTAT) 0.4 MG sublingual tablet Place 0.4 mg under the tongue    pregabalin (LYRICA) 50 MG capsule     Sertraline HCl 150 MG CAPS Take 1 capsule by mouth every 24 hours    traZODone (DESYREL) 100 MG tablet Take 100 mg by mouth     No current facility-administered medications  for this visit.          Physical Exam:   GENERAL: Healthy, alert and no distress  EYES: Eyes grossly normal to inspection.  No discharge or erythema, or obvious scleral/conjunctival abnormalities.  RESP: No audible wheeze, cough, or visible cyanosis.  No visible retractions or increased work of breathing.    SKIN: Visible skin clear. No significant rash, abnormal pigmentation or lesions.  NEURO: Cranial nerves grossly intact.  Mentation and speech appropriate for age.  PSYCH: Mentation appears normal, affect normal/bright, judgement and insight intact, normal speech and appearance well-groomed.       Labs:    8/30/2023 - UA: >300 protein, >1000 glucose, small blood, large LE, >100 WBC, 11-25 RBC, moderate bacteria (no urine culture)    9/8/2023 - serum Cr: 3.05    Prostate Specific Antigen Screen   Date Value Ref Range Status   11/17/2022 0.73 0.00 - 3.50 ng/mL Final         Imaging:    EXAM: CT ABDOMEN PELVIS W/O CONTRAST  LOCATION: [facility]  DATE: 6/27/2023    FINDINGS:   LOWER CHEST: Septal thickening and mild scarring both lower lung fields. Coronary artery calcifications.     HEPATOBILIARY: Hepatomegaly measuring 20 cm in length.     PANCREAS: Normal.     SPLEEN: Normal.     ADRENAL GLANDS: Normal.     KIDNEYS/BLADDER: Mild bilateral renal cortical atrophy and mild perinephric stranding. Extensive vascular calcifications both renal uvaldo. No definite stones. No hydronephrosis. Diffuse bladder wall thickening, incompletely distended.     BOWEL: Diverticula sigmoid colon, without evidence for diverticulitis or bowel obstruction.     LYMPH NODES: Normal.     VASCULATURE: Severe atherosclerotic disease abdominal aorta and iliac arteries. Plaque in all of the mesenteric arteries and at the origins of both renal arteries. Dense calcification of the splenic artery. Advanced peripheral vascular disease both groin   regions.     PELVIC ORGANS: Mild prostatic hypertrophy.      MUSCULOSKELETAL: Normal.                                                                      IMPRESSION:   1.  Severe atherosclerotic disease.  2.  Mild bilateral renal cortical atrophy and perinephric stranding.  3.  Sigmoid diverticulosis.  4.  Diffuse bladder wall thickening may represent hypertrophy from outlet obstruction secondary to an enlarged prostate gland.           30 minutes spent on the date of the encounter doing chart review, review of outside records, review of test results, interpretation of tests, patient visit, and documentation

## 2023-10-18 ENCOUNTER — PRE VISIT (OUTPATIENT)
Dept: UROLOGY | Facility: CLINIC | Age: 58
End: 2023-10-18
Payer: MEDICARE

## 2023-10-18 DIAGNOSIS — Z01.818 PRE-TRANSPLANT EVALUATION FOR KIDNEY TRANSPLANT: ICD-10-CM

## 2023-10-18 DIAGNOSIS — N32.89 BLADDER WALL THICKENING: Primary | ICD-10-CM

## 2023-10-18 NOTE — CONFIDENTIAL NOTE
Reason for nurse visit: UA/UC and pvr    Diagnosis: bladder wall thickening, pre-transplant evaluation    Ordering provider: Lorna Osborne PA-C    Last seen by provider: 10/17/2023     No Known Allergies     Aranza Bentley

## 2023-10-27 ENCOUNTER — OFFICE VISIT (OUTPATIENT)
Dept: UROLOGY | Facility: CLINIC | Age: 58
End: 2023-10-27
Payer: MEDICARE

## 2023-10-27 ENCOUNTER — LAB (OUTPATIENT)
Dept: LAB | Facility: CLINIC | Age: 58
End: 2023-10-27
Payer: MEDICARE

## 2023-10-27 DIAGNOSIS — Z01.818 ENCOUNTER FOR PRE-TRANSPLANT EVALUATION FOR KIDNEY AND PANCREAS TRANSPLANT: ICD-10-CM

## 2023-10-27 DIAGNOSIS — E11.69 TYPE 2 DIABETES MELLITUS WITH OTHER SPECIFIED COMPLICATION, WITH LONG-TERM CURRENT USE OF INSULIN (H): ICD-10-CM

## 2023-10-27 DIAGNOSIS — Z01.818 PRE-TRANSPLANT EVALUATION FOR KIDNEY TRANSPLANT: ICD-10-CM

## 2023-10-27 DIAGNOSIS — Z79.4 TYPE 2 DIABETES MELLITUS WITH OTHER SPECIFIED COMPLICATION, WITH LONG-TERM CURRENT USE OF INSULIN (H): ICD-10-CM

## 2023-10-27 DIAGNOSIS — N18.6 ESRD (END STAGE RENAL DISEASE) (H): ICD-10-CM

## 2023-10-27 DIAGNOSIS — Z76.82 ORGAN TRANSPLANT CANDIDATE: ICD-10-CM

## 2023-10-27 DIAGNOSIS — N32.89 BLADDER WALL THICKENING: Primary | ICD-10-CM

## 2023-10-27 DIAGNOSIS — N32.89 BLADDER WALL THICKENING: ICD-10-CM

## 2023-10-27 PROCEDURE — 51798 US URINE CAPACITY MEASURE: CPT

## 2023-10-27 NOTE — PROGRESS NOTES
Emmanuel Barnett comes into clinic today at the request of Lorna Osborne PA-C with the diagnosis of bladder wall thickening for PVR and UA/UC.    Orders verified    Yes    This service provided today was under the supervising provider of the day Saadia Aleman MD, who was available if needed.    Patient was unable to produce a urine sample at the time of the visit.  This writer scheduled the patient for a same day lab add-on for urine sample collection and performed a PVR, which showed a residual bladder volume of 68 mL.    Rubina Bowden LPN  10/27/2023  4:05 PM

## 2023-11-21 ENCOUNTER — TELEPHONE (OUTPATIENT)
Dept: PULMONOLOGY | Facility: CLINIC | Age: 58
End: 2023-11-21
Payer: MEDICARE

## 2023-11-21 NOTE — TELEPHONE ENCOUNTER
Left Voicemail (1st Attempt) for the patient to call back and schedule the following:    Appointment type: CXR  Provider: ANYA  Return date: 12/06/23  Specialty phone number: [phone]  Additional appointment(s) needed: N/A   Additonal Notes: N/A

## 2023-11-21 NOTE — CONFIDENTIAL NOTE
RECORDS RECEIVED FROM: internal /ce   DATE RECEIVED: 12.6.23    NOTES STATUS DETAILS   OFFICE NOTE from referring provider internal  Pre-Surgery Pulmonary Clearance Visits   Nay Magana PA-C      MEDICATION LIST internal     IMAGING  (NEED IMAGES AND REPORTS)     CT SCAN internal /ce internal -6.27.23,     [facility]- 12.15.21   CHEST XRAY (CXR) internal  11/17/22   TESTS     PULMONARY FUNCTION TESTING (PFT) internal  Scheduled 12.6.23        Action 11.21.23 sv    Action Taken Message sent to  pool to help schedule CXR order for future appt

## 2023-11-22 ENCOUNTER — TRANSFERRED RECORDS (OUTPATIENT)
Dept: HEALTH INFORMATION MANAGEMENT | Facility: CLINIC | Age: 58
End: 2023-11-22

## 2023-11-28 ENCOUNTER — TELEPHONE (OUTPATIENT)
Dept: PULMONOLOGY | Facility: CLINIC | Age: 58
End: 2023-11-28
Payer: MEDICARE

## 2023-11-28 NOTE — TELEPHONE ENCOUNTER
Left Voicemail (2nd Attempt) for the patient to call back and schedule the following:    Appointment type: CXR  Provider: ANYA  Return date: 12/6/23  Specialty phone number: [phone]  Additional appointment(s) needed: N/A  Additonal Notes: N/A

## 2023-11-30 ENCOUNTER — TRANSFERRED RECORDS (OUTPATIENT)
Dept: HEALTH INFORMATION MANAGEMENT | Facility: CLINIC | Age: 58
End: 2023-11-30

## 2023-12-06 ENCOUNTER — PRE VISIT (OUTPATIENT)
Dept: PULMONOLOGY | Facility: CLINIC | Age: 58
End: 2023-12-06

## 2023-12-06 ENCOUNTER — OFFICE VISIT (OUTPATIENT)
Dept: PULMONOLOGY | Facility: CLINIC | Age: 58
End: 2023-12-06
Attending: PHYSICIAN ASSISTANT
Payer: MEDICARE

## 2023-12-06 ENCOUNTER — ANCILLARY PROCEDURE (OUTPATIENT)
Dept: GENERAL RADIOLOGY | Facility: CLINIC | Age: 58
End: 2023-12-06
Attending: STUDENT IN AN ORGANIZED HEALTH CARE EDUCATION/TRAINING PROGRAM
Payer: MEDICARE

## 2023-12-06 VITALS — DIASTOLIC BLOOD PRESSURE: 57 MMHG | HEART RATE: 71 BPM | SYSTOLIC BLOOD PRESSURE: 134 MMHG | OXYGEN SATURATION: 95 %

## 2023-12-06 DIAGNOSIS — N18.6 END STAGE RENAL DISEASE (H): ICD-10-CM

## 2023-12-06 DIAGNOSIS — Z79.4 TYPE 2 DIABETES MELLITUS WITH OTHER SPECIFIED COMPLICATION, WITH LONG-TERM CURRENT USE OF INSULIN (H): ICD-10-CM

## 2023-12-06 DIAGNOSIS — N18.6 ESRD (END STAGE RENAL DISEASE) (H): ICD-10-CM

## 2023-12-06 DIAGNOSIS — Z76.82 ORGAN TRANSPLANT CANDIDATE: ICD-10-CM

## 2023-12-06 DIAGNOSIS — Z87.891 HISTORY OF TOBACCO USE: ICD-10-CM

## 2023-12-06 DIAGNOSIS — Z01.818 PRE-TRANSPLANT EVALUATION FOR KIDNEY TRANSPLANT: Primary | ICD-10-CM

## 2023-12-06 DIAGNOSIS — E11.69 TYPE 2 DIABETES MELLITUS WITH OTHER SPECIFIED COMPLICATION, WITH LONG-TERM CURRENT USE OF INSULIN (H): ICD-10-CM

## 2023-12-06 DIAGNOSIS — N32.89 BLADDER WALL THICKENING: ICD-10-CM

## 2023-12-06 DIAGNOSIS — I10 ESSENTIAL HYPERTENSION: ICD-10-CM

## 2023-12-06 DIAGNOSIS — I25.10 CARDIOVASCULAR DISEASE: ICD-10-CM

## 2023-12-06 DIAGNOSIS — Z01.818 PRE-TRANSPLANT EVALUATION FOR KIDNEY TRANSPLANT: ICD-10-CM

## 2023-12-06 DIAGNOSIS — E11.9 DIABETES MELLITUS, TYPE 2 (H): ICD-10-CM

## 2023-12-06 DIAGNOSIS — R93.89 ABNORMAL CHEST CT: ICD-10-CM

## 2023-12-06 DIAGNOSIS — N18.5 CHRONIC KIDNEY DISEASE, STAGE V (H): ICD-10-CM

## 2023-12-06 LAB
ALBUMIN UR-MCNC: 300 MG/DL
APPEARANCE UR: CLEAR
BILIRUB UR QL STRIP: NEGATIVE
COLOR UR AUTO: ABNORMAL
DLCOUNC-%PRED-PRE: 48 %
DLCOUNC-PRE: 12.49 ML/MIN/MMHG
DLCOUNC-PRED: 25.57 ML/MIN/MMHG
ERV-%PRED-PRE: 40 %
ERV-PRE: 0.58 L
ERV-PRED: 1.43 L
EXPTIME-PRE: 6.29 SEC
FEF2575-%PRED-PRE: 70 %
FEF2575-PRE: 1.85 L/SEC
FEF2575-PRED: 2.64 L/SEC
FEFMAX-%PRED-PRE: 85 %
FEFMAX-PRE: 7.35 L/SEC
FEFMAX-PRED: 8.64 L/SEC
FEV1-%PRED-PRE: 78 %
FEV1-PRE: 2.4 L
FEV1FEV6-PRE: 76 %
FEV1FEV6-PRED: 79 %
FEV1FVC-PRE: 76 %
FEV1FVC-PRED: 79 %
FEV1SVC-PRE: 76 %
FEV1SVC-PRED: 73 %
FIFMAX-PRE: 6.16 L/SEC
FVC-%PRED-PRE: 81 %
FVC-PRE: 3.17 L
FVC-PRED: 3.9 L
GLUCOSE UR STRIP-MCNC: 500 MG/DL
HGB UR QL STRIP: ABNORMAL
IC-%PRED-PRE: 89 %
IC-PRE: 2.57 L
IC-PRED: 2.87 L
KETONES UR STRIP-MCNC: NEGATIVE MG/DL
LEUKOCYTE ESTERASE UR QL STRIP: ABNORMAL
NITRATE UR QL: NEGATIVE
PH UR STRIP: 8 [PH] (ref 5–7)
RBC URINE: 8 /HPF
SP GR UR STRIP: 1.01 (ref 1–1.03)
SQUAMOUS EPITHELIAL: <1 /HPF
TRANSITIONAL EPI: <1 /HPF
UROBILINOGEN UR STRIP-MCNC: NORMAL MG/DL
VA-%PRED-PRE: 87 %
VA-PRE: 5.25 L
VC-%PRED-PRE: 74 %
VC-PRE: 3.15 L
VC-PRED: 4.2 L
WBC URINE: 32 /HPF

## 2023-12-06 PROCEDURE — 99000 SPECIMEN HANDLING OFFICE-LAB: CPT | Performed by: PATHOLOGY

## 2023-12-06 PROCEDURE — G0463 HOSPITAL OUTPT CLINIC VISIT: HCPCS | Performed by: STUDENT IN AN ORGANIZED HEALTH CARE EDUCATION/TRAINING PROGRAM

## 2023-12-06 PROCEDURE — 99204 OFFICE O/P NEW MOD 45 MIN: CPT | Performed by: STUDENT IN AN ORGANIZED HEALTH CARE EDUCATION/TRAINING PROGRAM

## 2023-12-06 PROCEDURE — 81001 URINALYSIS AUTO W/SCOPE: CPT | Performed by: PATHOLOGY

## 2023-12-06 PROCEDURE — 87086 URINE CULTURE/COLONY COUNT: CPT | Performed by: PHYSICIAN ASSISTANT

## 2023-12-06 PROCEDURE — 94375 RESPIRATORY FLOW VOLUME LOOP: CPT | Performed by: INTERNAL MEDICINE

## 2023-12-06 PROCEDURE — 94729 DIFFUSING CAPACITY: CPT | Performed by: INTERNAL MEDICINE

## 2023-12-06 PROCEDURE — 71046 X-RAY EXAM CHEST 2 VIEWS: CPT | Mod: GC | Performed by: RADIOLOGY

## 2023-12-06 ASSESSMENT — PAIN SCALES - GENERAL: PAINLEVEL: NO PAIN (0)

## 2023-12-06 NOTE — PROGRESS NOTES
Pulmonology Clinic Appointment     Emmanuel Barnett MRN# [id number]   Age: 58 year old YOB: 1965         Reason for consult:    Emmanuel Barnett is a 58 year old year old male who is being seen for New Patient (New pulm clearance/)        Requesting physician:              Chief Complaint:   Pre-operative evaluation     History is obtained from the patient         History of Present Illness:   Emmanuel Barnett is a 58 year old year old male with T2DM, CAD, ESRD, referred for pulmonary evaluation as part of pre-kidney transplant evaluation.     Med Hx:  -Left BKA Aug 2023  -ESRD on HD with LUE AVF  Appears referral placed on 9/26/23 by     Nay Magana PA-C   But no accompanying note available. Last available from the transplant nephrology group is from Nov 2022.     Patient is a prior smoker, having approximately a 25 pack year history having quit 2017. Patient denies any dyspnea with exertion, wheezing, recurrent cough concerning for bronchitis, nor respiratory failure exacerbations requiring systemic steroids. He denies having a pulmonologist. He had prior PFTs performed in July 2023 which showed normal spirometry (I.e. no osbtruction), he had subsequent testing done in September of this year which showed reduced FEV1/FVC but concurrently reduced FEV1 and FVC independently. Repeat testing today showed normal spirometry with moderate diffusion defect. Prior thoracic imaging is without any concerning parenchymal abnormalities. His most recent CT chest appears to be a low dose CT for lung cancer screening and did not show any specifically concerning findings for cancerous lesions. Though there were some subpleural dependent area opacities that most likely represent atelectasis and edema.   He is without any constitutional symptoms such as night sweats, weight loss, change of appetite.                      Past Medical History:     Past Medical History:   Diagnosis Date    Diabetes (H) Type 2      Hypertension             Past Surgical History:     Past Surgical History:   Procedure Laterality Date    CARDIAC SURGERY      stents            Social History:     Social History     Socioeconomic History    Marital status:      Spouse name: Not on file    Number of children: Not on file    Years of education: Not on file    Highest education level: Not on file   Occupational History    Not on file   Tobacco Use    Smoking status: Former     Packs/day: 1.50     Years: 15.00     Additional pack years: 0.00     Total pack years: 22.50     Types: Cigarettes     Quit date:      Years since quittin.9    Smokeless tobacco: Never   Substance and Sexual Activity    Alcohol use: Not Currently    Drug use: Never    Sexual activity: Not on file   Other Topics Concern    Not on file   Social History Narrative    Not on file     Social Determinants of Health     Financial Resource Strain: Not on file   Food Insecurity: Not on file   Transportation Needs: Not on file   Physical Activity: Not on file   Stress: Not on file   Social Connections: Not on file   Interpersonal Safety: Not on file   Housing Stability: Not on file             Family History:   No family history on file.        Immunizations:     Immunization History   Administered Date(s) Administered    COVID-19 MONOVALENT 12+ (Pfizer) 2021, 2021    COVID-19 Monovalent 18+ (Moderna) 2022          Allergies:   No Known Allergies       Medications:     Current Outpatient Medications   Medication    atorvastatin (LIPITOR) 40 MG tablet    calcium carbonate 500 mg, elemental, 1250 (500 Ca) MG tablet chewable    cholecalciferol 25 MCG (1000 UT) TABS    gabapentin (NEURONTIN) 600 MG tablet    glucose (BD GLUCOSE) 4 g chewable tablet    insulin aspart (NOVOLOG PEN) 100 UNIT/ML pen    insulin degludec (TRESIBA FLEXTOUCH) 100 UNIT/ML pen    lisinopril (PRINIVIL,ZESTRIL) 2.5 MG tablet    metoprolol (LOPRESSOR) 50 MG tablet    nitroGLYcerin  (NITROSTAT) 0.4 MG sublingual tablet    pregabalin (LYRICA) 50 MG capsule    Sertraline HCl 150 MG CAPS    traZODone (DESYREL) 100 MG tablet    aspirin 81 mg chewable tablet    multivitamin RENAL (RENAVITE RX/NEPHROVITE) 1 MG tablet     No current facility-administered medications for this visit.          Review of Systems:   The Review of Systems is negative other than noted in the HPI         Physical Exam:   /57 (BP Location: Right arm, Patient Position: Sitting)   Pulse 71   SpO2 95%   Patient declined being weighed.     GENERAL APPEARANCE:  alert, and in no apparent distress.  EYES: PERRL, EOMI  HENT: Nasal mucosa with no edema and no hyperemia. No nasal polyps.  MOUTH: Oral mucosa is moist, without any lesions, no tonsillar enlargement, no oropharyngeal exudate.  NECK: supple, no masses, no thyromegaly.  LYMPHATICS: No palpable axillary, cervical, or supraclavicular nodes.  RESP: good air flow throughout.  No crackles. No rhonchi. No wheezes. Normal percussion. Normal chest expansion  CV: Normal S1, S2, regular rhythm, normal rate. No murmur.  No rub. No gallop. No LE edema.   ABDOMEN:  soft, nontender, no HSM or masses. Normal bowel sounds  MS: extremities normal. No clubbing. No cyanosis. Poor hygiene with dirt under fingernails.   SKIN: no rash on limited exam   NEURO: speech normal, normal strength and tone, normal gait and stance  PSYCH: normal mood and affect         Data:   All laboratory data reviewed  All cardiac studies reviewed by me.  All imaging studies reviewed by me.    Recent Results (from the past 4368 hour(s))   General PFT Lab (Please always keep checked)    Collection Time: 07/06/23  1:08 PM   Result Value Ref Range    FVC-Pred 3.92 L    FVC-Pre 3.38 L    FVC-%Pred-Pre 86 %    FEV1-Pre 2.62 L    FEV1-%Pred-Pre 84 %    FEV1FVC-Pred 79 %    FEV1FVC-Pre 77 %    FEFMax-Pred 8.67 L/sec    FEFMax-Pre 8.32 L/sec    FEFMax-%Pred-Pre 95 %    FEF2575-Pred 2.67 L/sec    FEF2575-Pre 2.03 L/sec     [id number]-%Pred-Pre 76 %    ExpTime-Pre 6.61 sec    FIFMax-Pre 5.22 L/sec    VC-Pred 4.67 L    VC-Pre 3.66 L    VC-%Pred-Pre 78 %    IC-Pred 3.39 L    IC-Pre 2.46 L    IC-%Pred-Pre 72 %    ERV-Pred 1.18 L    ERV-Pre 1.20 L    ERV-%Pred-Pre 101 %    FEV1FEV6-Pred 79 %    FEV1FEV6-Pre 77 %    FRCPleth-Pred 3.30 L    FRCPleth-Pre 3.17 L    FRCPleth-%Pred-Pre 95 %    RVPleth-Pred 2.03 L    RVPleth-Pre 1.97 L    RVPleth-%Pred-Pre 96 %    TLCPleth-Pred 6.63 L    TLCPleth-Pre 5.63 L    TLCPleth-%Pred-Pre 84 %    DLCOunc-Pred 25.64 ml/min/mmHg    DLCOunc-Pre 13.34 ml/min/mmHg    DLCOunc-%Pred-Pre 52 %    VA-Pre 5.68 L    VA-%Pred-Pre 94 %    FEV1SVC-Pred 66 %    FEV1SVC-Pre 71 %   General PFT Lab (Please always keep checked)    Collection Time: 09/19/23  1:00 PM   Result Value Ref Range    FVC-Pred 3.91 L    FVC-Pre 2.40 L    FVC-%Pred-Pre 61 %    FEV1-Pre 1.66 L    FEV1-%Pred-Pre 53 %    FEV1FVC-Pred 79 %    FEV1FVC-Pre 69 %    FEFMax-Pred 8.65 L/sec    FEFMax-Pre 5.59 L/sec    FEFMax-%Pred-Pre 64 %    FEF2575-Pred 2.65 L/sec    FEF2575-Pre 1.00 L/sec    [id number]-%Pred-Pre 37 %    FEF2575-Post 1.05 L/sec    [id number]-%Pred-Post 39 %    ExpTime-Pre 7.69 sec    FIFMax-Pre 3.97 L/sec    VC-Pred 4.66 L    VC-Pre 2.58 L    VC-%Pred-Pre 55 %    IC-Pred 2.87 L    IC-Pre 1.98 L    IC-%Pred-Pre 68 %    ERV-Pred 1.44 L    ERV-Pre 0.60 L    ERV-%Pred-Pre 41 %    FEV1FEV6-Pred 79 %    FEV1FEV6-Pre 69 %    FRCPleth-Pred 3.31 L    FRCPleth-Pre 2.87 L    FRCPleth-%Pred-Pre 86 %    RVPleth-Pred 2.04 L    RVPleth-Pre 2.27 L    RVPleth-%Pred-Pre 111 %    TLCPleth-Pred 6.63 L    TLCPleth-Pre 4.85 L    TLCPleth-%Pred-Pre 73 %    DLCOunc-Pred 25.60 ml/min/mmHg    DLCOunc-Pre 11.03 ml/min/mmHg    DLCOunc-%Pred-Pre 43 %    VA-Pre 4.17 L    VA-%Pred-Pre 69 %    FEV1SVC-Pred 66 %    FEV1SVC-Pre 64 %   UA with Microscopic    Collection Time: 12/06/23 11:40 AM   Result Value Ref Range    Color Urine Light Yellow Colorless, Straw, Light Yellow, Yellow     Appearance Urine Clear Clear    Glucose Urine 500 (A) Negative mg/dL    Bilirubin Urine Negative Negative    Ketones Urine Negative Negative mg/dL    Specific Gravity Urine 1.012 1.003 - 1.035    Blood Urine Trace (A) Negative    pH Urine 8.0 (H) 5.0 - 7.0    Protein Albumin Urine 300 (A) Negative mg/dL    Urobilinogen Urine Normal Normal, 2.0 mg/dL    Nitrite Urine Negative Negative    Leukocyte Esterase Urine Trace (A) Negative    RBC Urine 8 (H) <=2 /HPF    WBC Urine 32 (H) <=5 /HPF    Squamous Epithelials Urine <1 <=1 /HPF    Transitional Epithelials Urine <1 <=1 /HPF   Pulmonary function test    Collection Time: 12/06/23 11:52 AM   Result Value Ref Range    FVC-Pred 3.90 L    FVC-Pre 3.17 L    FVC-%Pred-Pre 81 %    FEV1-Pre 2.40 L    FEV1-%Pred-Pre 78 %    FEV1FVC-Pred 79 %    FEV1FVC-Pre 76 %    FEFMax-Pred 8.64 L/sec    FEFMax-Pre 7.35 L/sec    FEFMax-%Pred-Pre 85 %    FEF2575-Pred 2.64 L/sec    FEF2575-Pre 1.85 L/sec    [id number]-%Pred-Pre 70 %    ExpTime-Pre 6.29 sec    FIFMax-Pre 6.16 L/sec    VC-Pred 4.20 L    VC-Pre 3.15 L    VC-%Pred-Pre 74 %    IC-Pred 2.87 L    IC-Pre 2.57 L    IC-%Pred-Pre 89 %    ERV-Pred 1.43 L    ERV-Pre 0.58 L    ERV-%Pred-Pre 40 %    FEV1FEV6-Pred 79 %    FEV1FEV6-Pre 76 %    DLCOunc-Pred 25.57 ml/min/mmHg    DLCOunc-Pre 12.49 ml/min/mmHg    DLCOunc-%Pred-Pre 48 %    VA-Pre 5.25 L    VA-%Pred-Pre 87 %    FEV1SVC-Pred 73 %    FEV1SVC-Pre 76 %                       Assessment and Plan:     Tobacco use history  ESRD  Pre-operative evaluation for kidney transplant  Emmanuel is without any absolute contraindication to surgery. He is at increased/intermediate risk of post-operative respiratory complications by the nature and site of the surgery being in the upper/mid abdomen. This can results in post-operative pleural effusion, usually isolateral to surgical side; atelectasis; mucus plugging; pneumonia; lung collapse; hypoxemia; and respiratory failure secondary to the aforementioned.  Standard post-operative care including early mobilization and pain control is paramount to reducing such risk. I would not recommend any other specific additional interventions in Emmanuel's case in terms of reducing his polo-operative risk.     4. Thoracic imaging abnormalities  I would recommend repeat thoracic imaging prior to surgery, in particular a prone CT scan (ordered) and will follow up and comment on findings.       I spent a total of 52 minutes on the day of the encounter dedicated to the office visit with Emmanuel CABRALES Zacariaskarina.    This included review of vitals, labs, imaging, other diagnostic tests (I.e. PFTs, ECHO), face-to-face interaction, physical exam, counseling the patient +/- family members, and/or coordinating care.    Jesse Batres MD

## 2023-12-06 NOTE — NURSING NOTE
Chief Complaint   Patient presents with    New Patient     New pulm clearance       Vitals were taken and medications were reconciled.    Elida Sanchez RMA  2:30 PM

## 2023-12-06 NOTE — LETTER
12/6/2023         RE: Emmanuel Barnett  [address] [facility] [address]        Dear Colleague,    Thank you for referring your patient, Emmanuel Barnett, to the [facility]. Please see a copy of my visit note below.      Pulmonology Clinic Appointment     Emmanuel Barnett MRN# [id number]   Age: 58 year old YOB: 1965         Reason for consult:    Emmanuel Barnett is a 58 year old year old male who is being seen for New Patient (New pulm clearance/)        Requesting physician:              Chief Complaint:   Pre-operative evaluation     History is obtained from the patient         History of Present Illness:   Emmanuel Barnett is a 58 year old year old male with T2DM, CAD, ESRD, referred for pulmonary evaluation as part of pre-kidney transplant evaluation.     Med Hx:  -Left BKA Aug 2023  -ESRD on HD with LUE AVF  Appears referral placed on 9/26/23 by     Nay Magana PA-C   But no accompanying note available. Last available from the transplant nephrology group is from Nov 2022.     Patient is a prior smoker, having approximately a 25 pack year history having quit 2017. Patient denies any dyspnea with exertion, wheezing, recurrent cough concerning for bronchitis, nor respiratory failure exacerbations requiring systemic steroids. He denies having a pulmonologist. He had prior PFTs performed in July 2023 which showed normal spirometry (I.e. no osbtruction), he had subsequent testing done in September of this year which showed reduced FEV1/FVC but concurrently reduced FEV1 and FVC independently. Repeat testing today showed normal spirometry with moderate diffusion defect. Prior thoracic imaging is without any concerning parenchymal abnormalities. His most recent CT chest appears to be a low dose CT for lung cancer screening and did not show any specifically concerning findings for cancerous lesions. Though there were some subpleural  dependent area opacities that most likely represent atelectasis and edema.   He is without any constitutional symptoms such as night sweats, weight loss, change of appetite.                      Past Medical History:     Past Medical History:   Diagnosis Date    Diabetes (H) Type 2     Hypertension             Past Surgical History:     Past Surgical History:   Procedure Laterality Date    CARDIAC SURGERY      stents            Social History:     Social History     Socioeconomic History    Marital status:      Spouse name: Not on file    Number of children: Not on file    Years of education: Not on file    Highest education level: Not on file   Occupational History    Not on file   Tobacco Use    Smoking status: Former     Packs/day: 1.50     Years: 15.00     Additional pack years: 0.00     Total pack years: 22.50     Types: Cigarettes     Quit date:      Years since quittin.9    Smokeless tobacco: Never   Substance and Sexual Activity    Alcohol use: Not Currently    Drug use: Never    Sexual activity: Not on file   Other Topics Concern    Not on file   Social History Narrative    Not on file     Social Determinants of Health     Financial Resource Strain: Not on file   Food Insecurity: Not on file   Transportation Needs: Not on file   Physical Activity: Not on file   Stress: Not on file   Social Connections: Not on file   Interpersonal Safety: Not on file   Housing Stability: Not on file             Family History:   No family history on file.        Immunizations:     Immunization History   Administered Date(s) Administered    COVID-19 MONOVALENT 12+ (Pfizer) 2021, 2021    COVID-19 Monovalent 18+ (Moderna) 2022          Allergies:   No Known Allergies       Medications:     Current Outpatient Medications   Medication    atorvastatin (LIPITOR) 40 MG tablet    calcium carbonate 500 mg, elemental, 1250 (500 Ca) MG tablet chewable    cholecalciferol 25 MCG (1000 UT) TABS     gabapentin (NEURONTIN) 600 MG tablet    glucose (BD GLUCOSE) 4 g chewable tablet    insulin aspart (NOVOLOG PEN) 100 UNIT/ML pen    insulin degludec (TRESIBA FLEXTOUCH) 100 UNIT/ML pen    lisinopril (PRINIVIL,ZESTRIL) 2.5 MG tablet    metoprolol (LOPRESSOR) 50 MG tablet    nitroGLYcerin (NITROSTAT) 0.4 MG sublingual tablet    pregabalin (LYRICA) 50 MG capsule    Sertraline HCl 150 MG CAPS    traZODone (DESYREL) 100 MG tablet    aspirin 81 mg chewable tablet    multivitamin RENAL (RENAVITE RX/NEPHROVITE) 1 MG tablet     No current facility-administered medications for this visit.          Review of Systems:   The Review of Systems is negative other than noted in the HPI         Physical Exam:   /57 (BP Location: Right arm, Patient Position: Sitting)   Pulse 71   SpO2 95%   Patient declined being weighed.     GENERAL APPEARANCE:  alert, and in no apparent distress.  EYES: PERRL, EOMI  HENT: Nasal mucosa with no edema and no hyperemia. No nasal polyps.  MOUTH: Oral mucosa is moist, without any lesions, no tonsillar enlargement, no oropharyngeal exudate.  NECK: supple, no masses, no thyromegaly.  LYMPHATICS: No palpable axillary, cervical, or supraclavicular nodes.  RESP: good air flow throughout.  No crackles. No rhonchi. No wheezes. Normal percussion. Normal chest expansion  CV: Normal S1, S2, regular rhythm, normal rate. No murmur.  No rub. No gallop. No LE edema.   ABDOMEN:  soft, nontender, no HSM or masses. Normal bowel sounds  MS: extremities normal. No clubbing. No cyanosis. Poor hygiene with dirt under fingernails.   SKIN: no rash on limited exam   NEURO: speech normal, normal strength and tone, normal gait and stance  PSYCH: normal mood and affect         Data:   All laboratory data reviewed  All cardiac studies reviewed by me.  All imaging studies reviewed by me.    Recent Results (from the past 4368 hour(s))   General PFT Lab (Please always keep checked)    Collection Time: 07/06/23  1:08 PM   Result  Value Ref Range    FVC-Pred 3.92 L    FVC-Pre 3.38 L    FVC-%Pred-Pre 86 %    FEV1-Pre 2.62 L    FEV1-%Pred-Pre 84 %    FEV1FVC-Pred 79 %    FEV1FVC-Pre 77 %    FEFMax-Pred 8.67 L/sec    FEFMax-Pre 8.32 L/sec    FEFMax-%Pred-Pre 95 %    FEF2575-Pred 2.67 L/sec    FEF2575-Pre 2.03 L/sec    SHD0062-%Pred-Pre 76 %    ExpTime-Pre 6.61 sec    FIFMax-Pre 5.22 L/sec    VC-Pred 4.67 L    VC-Pre 3.66 L    VC-%Pred-Pre 78 %    IC-Pred 3.39 L    IC-Pre 2.46 L    IC-%Pred-Pre 72 %    ERV-Pred 1.18 L    ERV-Pre 1.20 L    ERV-%Pred-Pre 101 %    FEV1FEV6-Pred 79 %    FEV1FEV6-Pre 77 %    FRCPleth-Pred 3.30 L    FRCPleth-Pre 3.17 L    FRCPleth-%Pred-Pre 95 %    RVPleth-Pred 2.03 L    RVPleth-Pre 1.97 L    RVPleth-%Pred-Pre 96 %    TLCPleth-Pred 6.63 L    TLCPleth-Pre 5.63 L    TLCPleth-%Pred-Pre 84 %    DLCOunc-Pred 25.64 ml/min/mmHg    DLCOunc-Pre 13.34 ml/min/mmHg    DLCOunc-%Pred-Pre 52 %    VA-Pre 5.68 L    VA-%Pred-Pre 94 %    FEV1SVC-Pred 66 %    FEV1SVC-Pre 71 %   General PFT Lab (Please always keep checked)    Collection Time: 09/19/23  1:00 PM   Result Value Ref Range    FVC-Pred 3.91 L    FVC-Pre 2.40 L    FVC-%Pred-Pre 61 %    FEV1-Pre 1.66 L    FEV1-%Pred-Pre 53 %    FEV1FVC-Pred 79 %    FEV1FVC-Pre 69 %    FEFMax-Pred 8.65 L/sec    FEFMax-Pre 5.59 L/sec    FEFMax-%Pred-Pre 64 %    FEF2575-Pred 2.65 L/sec    FEF2575-Pre 1.00 L/sec    [id number]-%Pred-Pre 37 %    FEF2575-Post 1.05 L/sec    HUF6079-%Pred-Post 39 %    ExpTime-Pre 7.69 sec    FIFMax-Pre 3.97 L/sec    VC-Pred 4.66 L    VC-Pre 2.58 L    VC-%Pred-Pre 55 %    IC-Pred 2.87 L    IC-Pre 1.98 L    IC-%Pred-Pre 68 %    ERV-Pred 1.44 L    ERV-Pre 0.60 L    ERV-%Pred-Pre 41 %    FEV1FEV6-Pred 79 %    FEV1FEV6-Pre 69 %    FRCPleth-Pred 3.31 L    FRCPleth-Pre 2.87 L    FRCPleth-%Pred-Pre 86 %    RVPleth-Pred 2.04 L    RVPleth-Pre 2.27 L    RVPleth-%Pred-Pre 111 %    TLCPleth-Pred 6.63 L    TLCPleth-Pre 4.85 L    TLCPleth-%Pred-Pre 73 %    DLCOunc-Pred 25.60 ml/min/mmHg     DLCOunc-Pre 11.03 ml/min/mmHg    DLCOunc-%Pred-Pre 43 %    VA-Pre 4.17 L    VA-%Pred-Pre 69 %    FEV1SVC-Pred 66 %    FEV1SVC-Pre 64 %   UA with Microscopic    Collection Time: 12/06/23 11:40 AM   Result Value Ref Range    Color Urine Light Yellow Colorless, Straw, Light Yellow, Yellow    Appearance Urine Clear Clear    Glucose Urine 500 (A) Negative mg/dL    Bilirubin Urine Negative Negative    Ketones Urine Negative Negative mg/dL    Specific Gravity Urine 1.012 1.003 - 1.035    Blood Urine Trace (A) Negative    pH Urine 8.0 (H) 5.0 - 7.0    Protein Albumin Urine 300 (A) Negative mg/dL    Urobilinogen Urine Normal Normal, 2.0 mg/dL    Nitrite Urine Negative Negative    Leukocyte Esterase Urine Trace (A) Negative    RBC Urine 8 (H) <=2 /HPF    WBC Urine 32 (H) <=5 /HPF    Squamous Epithelials Urine <1 <=1 /HPF    Transitional Epithelials Urine <1 <=1 /HPF   Pulmonary function test    Collection Time: 12/06/23 11:52 AM   Result Value Ref Range    FVC-Pred 3.90 L    FVC-Pre 3.17 L    FVC-%Pred-Pre 81 %    FEV1-Pre 2.40 L    FEV1-%Pred-Pre 78 %    FEV1FVC-Pred 79 %    FEV1FVC-Pre 76 %    FEFMax-Pred 8.64 L/sec    FEFMax-Pre 7.35 L/sec    FEFMax-%Pred-Pre 85 %    FEF2575-Pred 2.64 L/sec    FEF2575-Pre 1.85 L/sec    [id number]-%Pred-Pre 70 %    ExpTime-Pre 6.29 sec    FIFMax-Pre 6.16 L/sec    VC-Pred 4.20 L    VC-Pre 3.15 L    VC-%Pred-Pre 74 %    IC-Pred 2.87 L    IC-Pre 2.57 L    IC-%Pred-Pre 89 %    ERV-Pred 1.43 L    ERV-Pre 0.58 L    ERV-%Pred-Pre 40 %    FEV1FEV6-Pred 79 %    FEV1FEV6-Pre 76 %    DLCOunc-Pred 25.57 ml/min/mmHg    DLCOunc-Pre 12.49 ml/min/mmHg    DLCOunc-%Pred-Pre 48 %    VA-Pre 5.25 L    VA-%Pred-Pre 87 %    FEV1SVC-Pred 73 %    FEV1SVC-Pre 76 %                       Assessment and Plan:     Tobacco use history  ESRD  Pre-operative evaluation for kidney transplant  Emmanuel is without any absolute contraindication to surgery. He is at increased/intermediate risk of post-operative respiratory  complications by the nature and site of the surgery being in the upper/mid abdomen. This can results in post-operative pleural effusion, usually isolateral to surgical side; atelectasis; mucus plugging; pneumonia; lung collapse; hypoxemia; and respiratory failure secondary to the aforementioned. Standard post-operative care including early mobilization and pain control is paramount to reducing such risk. I would not recommend any other specific additional interventions in Emmanuel's case in terms of reducing his pool-operative risk.     4. Thoracic imaging abnormalities  I would recommend repeat thoracic imaging prior to surgery, in particular a prone CT scan (ordered) and will follow up and comment on findings.       I spent a total of 52 minutes on the day of the encounter dedicated to the office visit with Emmanuel SAMRA Adameskarina.    This included review of vitals, labs, imaging, other diagnostic tests (I.e. PFTs, ECHO), face-to-face interaction, physical exam, counseling the patient +/- family members, and/or coordinating care.    Jesse Batres MD

## 2023-12-07 LAB — BACTERIA UR CULT: NORMAL

## 2023-12-16 NOTE — RESULT ENCOUNTER NOTE
Results discussed directly with patient while patient was present. Any further details documented in the note.   Jesse Batres MD

## 2024-01-05 ENCOUNTER — ANCILLARY ORDERS (OUTPATIENT)
Dept: NUCLEAR MEDICINE | Facility: CLINIC | Age: 59
End: 2024-01-05

## 2024-01-05 ENCOUNTER — ANCILLARY ORDERS (OUTPATIENT)
Dept: RADIOLOGY | Facility: CLINIC | Age: 59
End: 2024-01-05

## 2024-01-05 DIAGNOSIS — T18.2XXS: Primary | ICD-10-CM

## 2024-01-05 DIAGNOSIS — D50.9 IRON DEFICIENCY ANEMIA, UNSPECIFIED: Primary | ICD-10-CM

## 2024-01-05 DIAGNOSIS — T18.2XXS: ICD-10-CM

## 2024-01-30 ENCOUNTER — TRANSFERRED RECORDS (OUTPATIENT)
Dept: HEALTH INFORMATION MANAGEMENT | Facility: CLINIC | Age: 59
End: 2024-01-30

## 2024-03-19 ENCOUNTER — TELEPHONE (OUTPATIENT)
Dept: TRANSPLANT | Facility: CLINIC | Age: 59
End: 2024-03-19
Payer: MEDICARE

## 2024-03-19 NOTE — TELEPHONE ENCOUNTER
Patient Call: General  Route to LPN    Reason for call: patient called to touch base and follow up. No details were given to this writer.    Call back needed? Yes    Return Call Needed  Same as documented in contacts section  When to return call?: Same day: Route High [facility]

## 2024-03-21 NOTE — TELEPHONE ENCOUNTER
Patient reports he has had 3 colonoscopies but there are no records in his chart, reported that he had them completed at [facility] - will request records. Will reach out to cardiology since there isn't an official cardiology clearance note. Plan for pt to update RNCC with GI plan

## 2024-03-27 ENCOUNTER — TRANSFERRED RECORDS (OUTPATIENT)
Dept: HEALTH INFORMATION MANAGEMENT | Facility: CLINIC | Age: 59
End: 2024-03-27

## 2024-05-13 ENCOUNTER — TELEPHONE (OUTPATIENT)
Dept: TRANSPLANT | Facility: CLINIC | Age: 59
End: 2024-05-13
Payer: MEDICARE

## 2024-05-13 NOTE — TELEPHONE ENCOUNTER
Patient Call: General  Route to LPN    Reason for call: Emmanuel stated he would like to follow up with Coordinator, regarding records being needed from [facility], fax number was given to patient, but is still wanting Coordinator to give him a call back.     Call back needed? Yes    Return Call Needed  Same as documented in contacts section  When to return call?: Same day: Route [facility]

## 2024-05-23 NOTE — TELEPHONE ENCOUNTER
Returned call to Emmanuel but was only able to leave a voice message asking him to return my call before 1600 today or to connect with Emma.

## 2024-10-29 ENCOUNTER — APPOINTMENT (OUTPATIENT)
Dept: ULTRASOUND IMAGING | Facility: CLINIC | Age: 59
DRG: 299 | End: 2024-10-29
Attending: PODIATRIST
Payer: MEDICARE

## 2024-10-29 ENCOUNTER — HOSPITAL ENCOUNTER (INPATIENT)
Facility: CLINIC | Age: 59
LOS: 1 days | Discharge: LEFT BEFORE DISCHARGE | DRG: 299 | End: 2024-10-30
Attending: EMERGENCY MEDICINE | Admitting: STUDENT IN AN ORGANIZED HEALTH CARE EDUCATION/TRAINING PROGRAM
Payer: MEDICARE

## 2024-10-29 ENCOUNTER — APPOINTMENT (OUTPATIENT)
Dept: ULTRASOUND IMAGING | Facility: CLINIC | Age: 59
DRG: 299 | End: 2024-10-29
Attending: STUDENT IN AN ORGANIZED HEALTH CARE EDUCATION/TRAINING PROGRAM
Payer: MEDICARE

## 2024-10-29 ENCOUNTER — APPOINTMENT (OUTPATIENT)
Dept: RADIOLOGY | Facility: CLINIC | Age: 59
DRG: 299 | End: 2024-10-29
Attending: EMERGENCY MEDICINE
Payer: MEDICARE

## 2024-10-29 ENCOUNTER — APPOINTMENT (OUTPATIENT)
Dept: MRI IMAGING | Facility: CLINIC | Age: 59
DRG: 299 | End: 2024-10-29
Attending: PODIATRIST
Payer: MEDICARE

## 2024-10-29 DIAGNOSIS — L08.9 RIGHT FOOT INFECTION: ICD-10-CM

## 2024-10-29 LAB
ABO/RH(D): NORMAL
ANION GAP SERPL CALCULATED.3IONS-SCNC: 16 MMOL/L (ref 7–15)
ANTIBODY SCREEN: NEGATIVE
APTT PPP: 36 SECONDS (ref 22–38)
B-OH-BUTYR SERPL-SCNC: <0.18 MMOL/L
BUN SERPL-MCNC: 33.8 MG/DL (ref 8–23)
CALCIUM SERPL-MCNC: 9.1 MG/DL (ref 8.8–10.4)
CHLORIDE SERPL-SCNC: 84 MMOL/L (ref 98–107)
CREAT SERPL-MCNC: 5.17 MG/DL (ref 0.67–1.17)
CRP SERPL-MCNC: 137 MG/L
EGFRCR SERPLBLD CKD-EPI 2021: 12 ML/MIN/1.73M2
ERYTHROCYTE [DISTWIDTH] IN BLOOD BY AUTOMATED COUNT: 14.2 % (ref 10–15)
EST. AVERAGE GLUCOSE BLD GHB EST-MCNC: 329 MG/DL
GLUCOSE BLDC GLUCOMTR-MCNC: 330 MG/DL (ref 70–99)
GLUCOSE SERPL-MCNC: 454 MG/DL (ref 70–99)
HBA1C MFR BLD: 13.1 %
HCO3 SERPL-SCNC: 30 MMOL/L (ref 22–29)
HCT VFR BLD AUTO: 30.9 % (ref 40–53)
HGB BLD-MCNC: 10.6 G/DL (ref 13.3–17.7)
INR PPP: 1.35 (ref 0.85–1.15)
LACTATE SERPL-SCNC: 1 MMOL/L (ref 0.7–2)
MCH RBC QN AUTO: 29 PG (ref 26.5–33)
MCHC RBC AUTO-ENTMCNC: 34.3 G/DL (ref 31.5–36.5)
MCV RBC AUTO: 85 FL (ref 78–100)
PLATELET # BLD AUTO: 384 10E3/UL (ref 150–450)
POTASSIUM SERPL-SCNC: 3.8 MMOL/L (ref 3.4–5.3)
RBC # BLD AUTO: 3.65 10E6/UL (ref 4.4–5.9)
SODIUM SERPL-SCNC: 130 MMOL/L (ref 135–145)
SPECIMEN EXPIRATION DATE: NORMAL
WBC # BLD AUTO: 11.8 10E3/UL (ref 4–11)

## 2024-10-29 PROCEDURE — 83605 ASSAY OF LACTIC ACID: CPT | Performed by: EMERGENCY MEDICINE

## 2024-10-29 PROCEDURE — 85610 PROTHROMBIN TIME: CPT | Performed by: STUDENT IN AN ORGANIZED HEALTH CARE EDUCATION/TRAINING PROGRAM

## 2024-10-29 PROCEDURE — 258N000003 HC RX IP 258 OP 636: Performed by: EMERGENCY MEDICINE

## 2024-10-29 PROCEDURE — 250N000011 HC RX IP 250 OP 636: Performed by: EMERGENCY MEDICINE

## 2024-10-29 PROCEDURE — 86900 BLOOD TYPING SEROLOGIC ABO: CPT | Performed by: STUDENT IN AN ORGANIZED HEALTH CARE EDUCATION/TRAINING PROGRAM

## 2024-10-29 PROCEDURE — 99285 EMERGENCY DEPT VISIT HI MDM: CPT | Mod: 25

## 2024-10-29 PROCEDURE — 93922 UPR/L XTREMITY ART 2 LEVELS: CPT | Mod: 52

## 2024-10-29 PROCEDURE — 83036 HEMOGLOBIN GLYCOSYLATED A1C: CPT | Performed by: STUDENT IN AN ORGANIZED HEALTH CARE EDUCATION/TRAINING PROGRAM

## 2024-10-29 PROCEDURE — 250N000013 HC RX MED GY IP 250 OP 250 PS 637: Performed by: STUDENT IN AN ORGANIZED HEALTH CARE EDUCATION/TRAINING PROGRAM

## 2024-10-29 PROCEDURE — 82010 KETONE BODYS QUAN: CPT | Performed by: EMERGENCY MEDICINE

## 2024-10-29 PROCEDURE — 120N000001 HC R&B MED SURG/OB

## 2024-10-29 PROCEDURE — G1010 CDSM STANSON: HCPCS

## 2024-10-29 PROCEDURE — 96365 THER/PROPH/DIAG IV INF INIT: CPT

## 2024-10-29 PROCEDURE — 99223 1ST HOSP IP/OBS HIGH 75: CPT | Performed by: STUDENT IN AN ORGANIZED HEALTH CARE EDUCATION/TRAINING PROGRAM

## 2024-10-29 PROCEDURE — 80048 BASIC METABOLIC PNL TOTAL CA: CPT | Performed by: EMERGENCY MEDICINE

## 2024-10-29 PROCEDURE — 93926 LOWER EXTREMITY STUDY: CPT | Mod: RT

## 2024-10-29 PROCEDURE — 250N000013 HC RX MED GY IP 250 OP 250 PS 637: Performed by: EMERGENCY MEDICINE

## 2024-10-29 PROCEDURE — 86140 C-REACTIVE PROTEIN: CPT | Performed by: EMERGENCY MEDICINE

## 2024-10-29 PROCEDURE — 36415 COLL VENOUS BLD VENIPUNCTURE: CPT | Performed by: EMERGENCY MEDICINE

## 2024-10-29 PROCEDURE — 85730 THROMBOPLASTIN TIME PARTIAL: CPT | Performed by: STUDENT IN AN ORGANIZED HEALTH CARE EDUCATION/TRAINING PROGRAM

## 2024-10-29 PROCEDURE — 86901 BLOOD TYPING SEROLOGIC RH(D): CPT | Performed by: STUDENT IN AN ORGANIZED HEALTH CARE EDUCATION/TRAINING PROGRAM

## 2024-10-29 PROCEDURE — 87040 BLOOD CULTURE FOR BACTERIA: CPT | Performed by: EMERGENCY MEDICINE

## 2024-10-29 PROCEDURE — 82947 ASSAY GLUCOSE BLOOD QUANT: CPT | Performed by: EMERGENCY MEDICINE

## 2024-10-29 PROCEDURE — 82962 GLUCOSE BLOOD TEST: CPT

## 2024-10-29 PROCEDURE — 36415 COLL VENOUS BLD VENIPUNCTURE: CPT | Performed by: STUDENT IN AN ORGANIZED HEALTH CARE EDUCATION/TRAINING PROGRAM

## 2024-10-29 PROCEDURE — 85027 COMPLETE CBC AUTOMATED: CPT | Performed by: EMERGENCY MEDICINE

## 2024-10-29 PROCEDURE — 73630 X-RAY EXAM OF FOOT: CPT | Mod: RT

## 2024-10-29 RX ORDER — MIDODRINE HYDROCHLORIDE 5 MG/1
5 TABLET ORAL
Status: DISCONTINUED | OUTPATIENT
Start: 2024-10-30 | End: 2024-10-30 | Stop reason: HOSPADM

## 2024-10-29 RX ORDER — PIPERACILLIN SODIUM, TAZOBACTAM SODIUM 3; .375 G/15ML; G/15ML
3.38 INJECTION, POWDER, LYOPHILIZED, FOR SOLUTION INTRAVENOUS EVERY 12 HOURS
Status: DISCONTINUED | OUTPATIENT
Start: 2024-10-30 | End: 2024-10-30 | Stop reason: HOSPADM

## 2024-10-29 RX ORDER — METOPROLOL SUCCINATE 25 MG/1
25 TABLET, EXTENDED RELEASE ORAL DAILY
COMMUNITY
Start: 2024-04-18

## 2024-10-29 RX ORDER — ACETAMINOPHEN 325 MG/1
650 TABLET ORAL EVERY 4 HOURS PRN
Status: DISCONTINUED | OUTPATIENT
Start: 2024-10-29 | End: 2024-10-30 | Stop reason: HOSPADM

## 2024-10-29 RX ORDER — PREGABALIN 75 MG/1
75 CAPSULE ORAL ONCE
Status: COMPLETED | OUTPATIENT
Start: 2024-10-29 | End: 2024-10-29

## 2024-10-29 RX ORDER — ESCITALOPRAM OXALATE 20 MG/1
20 TABLET ORAL EVERY MORNING
COMMUNITY
Start: 2024-06-14

## 2024-10-29 RX ORDER — CALCIUM CARBONATE 500 MG/1
1000 TABLET, CHEWABLE ORAL 4 TIMES DAILY PRN
Status: DISCONTINUED | OUTPATIENT
Start: 2024-10-29 | End: 2024-10-30 | Stop reason: HOSPADM

## 2024-10-29 RX ORDER — NICOTINE POLACRILEX 4 MG
15-30 LOZENGE BUCCAL
Status: DISCONTINUED | OUTPATIENT
Start: 2024-10-29 | End: 2024-10-30 | Stop reason: HOSPADM

## 2024-10-29 RX ORDER — PREGABALIN 75 MG/1
75 CAPSULE ORAL 2 TIMES DAILY
COMMUNITY
Start: 2024-01-25

## 2024-10-29 RX ORDER — MIDODRINE HYDROCHLORIDE 5 MG/1
5 TABLET ORAL SEE ADMIN INSTRUCTIONS
COMMUNITY
Start: 2024-04-01

## 2024-10-29 RX ORDER — PIPERACILLIN SODIUM, TAZOBACTAM SODIUM 3; .375 G/15ML; G/15ML
3.38 INJECTION, POWDER, LYOPHILIZED, FOR SOLUTION INTRAVENOUS ONCE
Status: COMPLETED | OUTPATIENT
Start: 2024-10-29 | End: 2024-10-29

## 2024-10-29 RX ORDER — SEVELAMER CARBONATE 800 MG/1
2400 TABLET, FILM COATED ORAL
Status: DISCONTINUED | OUTPATIENT
Start: 2024-10-30 | End: 2024-10-30 | Stop reason: HOSPADM

## 2024-10-29 RX ORDER — SEVELAMER CARBONATE 800 MG/1
2400 TABLET, FILM COATED ORAL
COMMUNITY
Start: 2024-03-27

## 2024-10-29 RX ORDER — ACETAMINOPHEN 650 MG/1
650 SUPPOSITORY RECTAL EVERY 4 HOURS PRN
Status: DISCONTINUED | OUTPATIENT
Start: 2024-10-29 | End: 2024-10-30 | Stop reason: HOSPADM

## 2024-10-29 RX ORDER — LIDOCAINE 40 MG/G
CREAM TOPICAL
Status: DISCONTINUED | OUTPATIENT
Start: 2024-10-29 | End: 2024-10-30 | Stop reason: HOSPADM

## 2024-10-29 RX ORDER — OXYCODONE HYDROCHLORIDE 5 MG/1
5 TABLET ORAL EVERY 4 HOURS PRN
Status: DISCONTINUED | OUTPATIENT
Start: 2024-10-29 | End: 2024-10-30 | Stop reason: HOSPADM

## 2024-10-29 RX ORDER — HYDROMORPHONE HYDROCHLORIDE 2 MG/1
2 TABLET ORAL EVERY 4 HOURS PRN
Status: DISCONTINUED | OUTPATIENT
Start: 2024-10-29 | End: 2024-10-30 | Stop reason: HOSPADM

## 2024-10-29 RX ORDER — AMOXICILLIN 250 MG
2 CAPSULE ORAL 2 TIMES DAILY PRN
Status: DISCONTINUED | OUTPATIENT
Start: 2024-10-29 | End: 2024-10-30 | Stop reason: HOSPADM

## 2024-10-29 RX ORDER — TRAZODONE HYDROCHLORIDE 150 MG/1
300 TABLET ORAL AT BEDTIME
Status: DISCONTINUED | OUTPATIENT
Start: 2024-10-29 | End: 2024-10-29

## 2024-10-29 RX ORDER — DEXTROSE MONOHYDRATE 25 G/50ML
25-50 INJECTION, SOLUTION INTRAVENOUS
Status: DISCONTINUED | OUTPATIENT
Start: 2024-10-29 | End: 2024-10-30 | Stop reason: HOSPADM

## 2024-10-29 RX ORDER — ATORVASTATIN CALCIUM 40 MG/1
80 TABLET, FILM COATED ORAL DAILY
Status: DISCONTINUED | OUTPATIENT
Start: 2024-10-30 | End: 2024-10-30 | Stop reason: HOSPADM

## 2024-10-29 RX ORDER — ATORVASTATIN CALCIUM 80 MG/1
80 TABLET, FILM COATED ORAL DAILY
COMMUNITY
Start: 2024-04-18

## 2024-10-29 RX ORDER — AMOXICILLIN 250 MG
1 CAPSULE ORAL 2 TIMES DAILY PRN
Status: DISCONTINUED | OUTPATIENT
Start: 2024-10-29 | End: 2024-10-30 | Stop reason: HOSPADM

## 2024-10-29 RX ORDER — TRAZODONE HYDROCHLORIDE 150 MG/1
300 TABLET ORAL AT BEDTIME
COMMUNITY
Start: 2024-04-17

## 2024-10-29 RX ORDER — ESCITALOPRAM OXALATE 20 MG/1
20 TABLET ORAL EVERY MORNING
Status: DISCONTINUED | OUTPATIENT
Start: 2024-10-30 | End: 2024-10-30 | Stop reason: HOSPADM

## 2024-10-29 RX ORDER — PREGABALIN 75 MG/1
75 CAPSULE ORAL 2 TIMES DAILY
Status: DISCONTINUED | OUTPATIENT
Start: 2024-10-29 | End: 2024-10-30 | Stop reason: HOSPADM

## 2024-10-29 RX ORDER — OXYCODONE HYDROCHLORIDE 5 MG/1
5 TABLET ORAL ONCE
Status: COMPLETED | OUTPATIENT
Start: 2024-10-29 | End: 2024-10-29

## 2024-10-29 RX ORDER — TRAZODONE HYDROCHLORIDE 50 MG/1
150 TABLET, FILM COATED ORAL AT BEDTIME
Status: DISCONTINUED | OUTPATIENT
Start: 2024-10-29 | End: 2024-10-30 | Stop reason: HOSPADM

## 2024-10-29 RX ORDER — ACETAMINOPHEN 325 MG/1
975 TABLET ORAL ONCE
Status: COMPLETED | OUTPATIENT
Start: 2024-10-29 | End: 2024-10-29

## 2024-10-29 RX ADMIN — PREGABALIN 75 MG: 75 CAPSULE ORAL at 17:22

## 2024-10-29 RX ADMIN — OXYCODONE 5 MG: 5 TABLET ORAL at 17:41

## 2024-10-29 RX ADMIN — INSULIN DEGLUDEC 18 UNITS: 100 INJECTION, SOLUTION SUBCUTANEOUS at 22:19

## 2024-10-29 RX ADMIN — TRAZODONE HYDROCHLORIDE 150 MG: 50 TABLET ORAL at 22:18

## 2024-10-29 RX ADMIN — OXYCODONE 5 MG: 5 TABLET ORAL at 16:31

## 2024-10-29 RX ADMIN — ACETAMINOPHEN 975 MG: 325 TABLET ORAL at 16:31

## 2024-10-29 RX ADMIN — PIPERACILLIN AND TAZOBACTAM 3.38 G: 3; .375 INJECTION, POWDER, FOR SOLUTION INTRAVENOUS at 17:44

## 2024-10-29 RX ADMIN — PREGABALIN 75 MG: 75 CAPSULE ORAL at 22:17

## 2024-10-29 RX ADMIN — VANCOMYCIN HYDROCHLORIDE 1750 MG: 5 INJECTION, POWDER, LYOPHILIZED, FOR SOLUTION INTRAVENOUS at 18:54

## 2024-10-29 ASSESSMENT — COLUMBIA-SUICIDE SEVERITY RATING SCALE - C-SSRS
3. HAVE YOU BEEN THINKING ABOUT HOW YOU MIGHT KILL YOURSELF?: NO
6. HAVE YOU EVER DONE ANYTHING, STARTED TO DO ANYTHING, OR PREPARED TO DO ANYTHING TO END YOUR LIFE?: NO
5. HAVE YOU STARTED TO WORK OUT OR WORKED OUT THE DETAILS OF HOW TO KILL YOURSELF? DO YOU INTEND TO CARRY OUT THIS PLAN?: NO
2. HAVE YOU ACTUALLY HAD ANY THOUGHTS OF KILLING YOURSELF IN THE PAST MONTH?: YES
4. HAVE YOU HAD THESE THOUGHTS AND HAD SOME INTENTION OF ACTING ON THEM?: NO
1. IN THE PAST MONTH, HAVE YOU WISHED YOU WERE DEAD OR WISHED YOU COULD GO TO SLEEP AND NOT WAKE UP?: YES

## 2024-10-29 ASSESSMENT — ACTIVITIES OF DAILY LIVING (ADL)
ADLS_ACUITY_SCORE: 0

## 2024-10-29 NOTE — MEDICATION SCRIBE - ADMISSION MEDICATION HISTORY
Medication Scribe Admission Medication History    Admission medication history is complete. The information provided in this note is only as accurate as the sources available at the time of the update.    Information Source(s): Patient, Clinic records, and CareEverywhere/SureScripts via in-person. Called patient's [facility] for fill history that may not appear on SureScripts.     Pertinent Information: Patient reports that he has not taken any medication in approximately a week other than sevelamer and trazodone which were both taken last night. Somewhat of a poor medication historian. Called the patient's pharmacy who reported he had been filling prescriptions recently despite our fill history being either incomplete or inconsistent.     Has insulin aspart 1 unit(s) per 5 g of carbs with meals and insulin degludec 50 unit(s) daily. Patient reported insulin degludec was 26 unit(s) every day. Both insulin have not been taken in about a month per the patient. Patient reports difficulty testing blood sugars at home due to his continuous glucose monitor not being linked to his phone.     Changes made to PTA medication list:  Added:   Escitalopram 20 mg  Midodrine 5 mg  Sevelamer 800 mg  Midodrine 5 mg  Deleted:   Calcium carbonate 500 mg  Vit D 1000 unit(s)   Glucose 4 g tab  Lisinopril 2.5 mg  Multivitamin   Sertraline 150 mg  Gabapentin 100 mg  Changed:   Metoprolol tartrate 50 mg --> succinate 25 mg  Atorvastatin 40 mg --> 80 mg    Allergies reviewed with patient and updates made in EHR: yes    Medication History Completed By: Jovanni Knox 10/29/2024 4:53 PM    PTA Med List   Medication Sig Note Last Dose/Taking    aspirin 81 mg chewable tablet Take 81 mg by mouth daily.  Past Week    atorvastatin (LIPITOR) 80 MG tablet Take 80 mg by mouth daily.  Past Week    escitalopram (LEXAPRO) 20 MG tablet Take 20 mg by mouth every morning.  Taking    insulin aspart (NOVOLOG PEN) 100 UNIT/ML pen Take 1 unit per 5 grams of  carbs with meals (if carb count unknown take 15 units)  Past Month    insulin degludec (TRESIBA FLEXTOUCH) 100 UNIT/ML pen Inject 50 Units Subcutaneous 10/29/2024: Potentially 26 unit(s) every day.  Past Month    metoprolol succinate ER (TOPROL XL) 25 MG 24 hr tablet Take 25 mg by mouth daily.  Past Month    midodrine (PROAMATINE) 5 MG tablet Take 5 mg by mouth See Admin Instructions. Take one tablet by mouth before dialysis  Unknown    nitroGLYcerin (NITROSTAT) 0.4 MG sublingual tablet Place 0.4 mg under the tongue  More than a month    pregabalin (LYRICA) 75 MG capsule Take 75 mg by mouth 2 times daily.  Past Month    sevelamer carbonate (RENVELA) 800 MG tablet Take 2,400 mg by mouth 3 times daily (with meals).  10/28/2024 Evening    traZODone (DESYREL) 150 MG tablet Take 300 mg by mouth at bedtime.  10/28/2024 Bedtime

## 2024-10-29 NOTE — ED TRIAGE NOTES
Patient has black discoloration to a couple of his right toes over last 1 week. History of similar presentation on left foot which resulted in left below the knee amputation. Chills. Temp in triage 99.9. On dialysis.    Triage Assessment (Adult)       Row Name 10/29/24 1319          Triage Assessment    Airway WDL WDL        Respiratory WDL    Respiratory WDL WDL        Skin Circulation/Temperature WDL    Skin Circulation/Temperature WDL X  black discoloration to toes.        Cardiac WDL    Cardiac WDL X;rhythm     Pulse Rate & Regularity bradycardic        Peripheral/Neurovascular WDL    Peripheral Neurovascular WDL X;neurovascular assessment lower        Cognitive/Neuro/Behavioral WDL    Cognitive/Neuro/Behavioral WDL WDL        LLE Neurovascular Assessment    Sensation LLE numbness present;tingling present        RLE Neurovascular Assessment    Sensation RLE numbness present;tingling present

## 2024-10-29 NOTE — PHARMACY-VANCOMYCIN DOSING SERVICE
Pharmacy Vancomycin Initial Note  Date of Service 2024  Patient's  1965  59 year old, male    Indication: Osteomyelitis and Sepsis    Current estimated CrCl = Estimated Creatinine Clearance: 17.3 mL/min (A) (based on SCr of 5.17 mg/dL (H)).    Creatinine for last 3 days  10/29/2024:  4:00 PM Creatinine 5.17 mg/dL    Recent Vancomycin Level(s) for last 3 days  No results found for requested labs within last 3 days.      Vancomycin IV Administrations (past 72 hours)        No vancomycin orders with administrations in past 72 hours.                    Nephrotoxins and other renal medications (From now, onward)      Start     Dose/Rate Route Frequency Ordered Stop    10/29/24 1700  piperacillin-tazobactam (ZOSYN) 3.375 g vial to attach to  mL bag         3.375 g  over 30 Minutes Intravenous ONCE 10/29/24 1654              Contrast Orders - past 72 hours (72h ago, onward)      None                Plan:  Patient is on dialysis Monday, Wednesday, and Friday. Patient had dialysis yesterday. Will give vancomycin  1,750 mg IV x 1 (load of ~ 20 mg/kg) this pm. Will order level for tomorrow am in anticipation of continuing vancomycin as well as dialysis.   2.   If vancomycin therapy to continue while patient admitted, please re-consult pharmacy to dose vancomycin.     Thank you for the consult.   Colleen Lamb, PharmD, BCPS

## 2024-10-29 NOTE — ED PROVIDER NOTES
"EMERGENCY DEPARTMENT ENCOUNTER      NAME: Emmanuel Barnett  AGE: 59 year old male  YOB: 1965  MRN: [id number]  EVALUATION DATE & TIME: No admission date for patient encounter.    PCP: Iliana Anderson    ED PROVIDER: Salvador Chase M.D.      Chief Complaint   Patient presents with    Foot Problems         FINAL IMPRESSION:  1. Right foot infection          ED COURSE & MEDICAL DECISION MAKING:    Pertinent Labs & Imaging studies reviewed. (See chart for details)    ED Course as of 10/29/24 2249   Tue Oct 29, 2024   1708 Patient is a 59-year-old gentleman with a history of end-stage renal disease on dialysis (Monday Wednesday Friday), diabetes, status post left BKA, presents with pain, skin changes in his right foot that were noticed over the past couple of days.  Due to the boarding crisis patient was initially evaluated in the waiting room, and his initial vital signs are relatively reassuring, he is not tachycardic or hypotensive.  His right foot has multiple necrotic toes, redness and swelling to the dorsum of the foot extending up the ankle concerning for cellulitis.  Concerned about osteomyelitis given his clinical picture.  Plan to get an x-ray, screening lab work and get broad-spectrum antibiotics started.   Lab work here showed mild leukocytosis at 11.8.  CRP elevation at 137.  His BMP had an increased anion gap, metabolic alkalosis.  I spoke to Dr. Levine with podiatry, he he recommends MRI, extremity arterial ultrasounds, he will see the patient tomorrow. At this time no emergent plan for surgery, but MRI can guide next steps.    Blood cultures obtained, broad-spectrum attics were started.  Oral analgesia was given.  He was admitted to the hospitalist      Medical Decision Making  Obtained supplemental history:Supplemental history obtained?: No  Reviewed external records: I reviewed visit from 10/24/24, clinic wound care:   \"59 y.o. 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 The wounds are noted to be improved per patient report and review of photographs\"  Care impacted by chronic illness:Documented in Chart  Care significantly affected by social determinants of health:N/A  Did you consider but not order tests?: Work up considered but not performed and documented in chart, if applicable  Did you interpret images independently?: Independent interpretation of ECG and images noted in documentation, when applicable.  Consultation discussion with other provider: See documentation for phone consultations  Admit.    Not Applicable        MEDICATIONS GIVEN IN THE EMERGENCY:  Medications   oxyCODONE (ROXICODONE) tablet 5 mg (5 mg Oral $Given 10/29/24 1631)   lidocaine 1 % 0.1-1 mL (has no administration in time range)   lidocaine (LMX4) cream (has no administration in time range)   sodium chloride (PF) 0.9% PF flush 3 mL (3 mLs Intracatheter Not Given 10/29/24 2211)   sodium chloride (PF) 0.9% PF flush 3 mL (has no administration in time range)   senna-docusate (SENOKOT-S/PERICOLACE) 8.6-50 MG per tablet 1 tablet (has no administration in time range)     Or   senna-docusate (SENOKOT-S/PERICOLACE) 8.6-50 MG per tablet 2 tablet (has no administration in time range)   calcium carbonate (TUMS) chewable tablet 1,000 mg (has no administration in time range)   acetaminophen (TYLENOL) tablet 650 mg (has no administration in time range)     Or   acetaminophen (TYLENOL) Suppository 650 mg (has no administration in time range)   HYDROmorphone (DILAUDID) half-tab 1 mg (has no administration in time range)   HYDROmorphone (DILAUDID) tablet 2 mg (has no administration in time range)   piperacillin-tazobactam (ZOSYN) 3.375 g vial to attach to  mL bag (has no administration in time range)   vancomycin place harley - receiving intermittent dosing (has no administration in time range) " "  atorvastatin (LIPITOR) tablet 80 mg (has no administration in time range)   escitalopram (LEXAPRO) tablet 20 mg (has no administration in time range)   insulin degludec (TRESIBA) 100 UNIT/ML injection 18 Units (18 Units Subcutaneous $Given 10/29/24 2219)   midodrine (PROAMATINE) tablet 5 mg (has no administration in time range)   pregabalin (LYRICA) capsule 75 mg (75 mg Oral $Given 10/29/24 2217)   sevelamer carbonate (RENVELA) tablet 2,400 mg (has no administration in time range)   glucose gel 15-30 g (has no administration in time range)     Or   dextrose 50 % injection 25-50 mL (has no administration in time range)     Or   glucagon injection 1 mg (has no administration in time range)   insulin aspart (NovoLOG) injection (RAPID ACTING) (has no administration in time range)   insulin aspart (NovoLOG) injection (RAPID ACTING) (3 Units Subcutaneous $Given 10/29/24 2219)   traZODone (DESYREL) tablet 150 mg (150 mg Oral $Given 10/29/24 2218)   acetaminophen (TYLENOL) tablet 975 mg (975 mg Oral $Given 10/29/24 1631)   piperacillin-tazobactam (ZOSYN) 3.375 g vial to attach to  mL bag (0 g Intravenous Stopped 10/29/24 1815)   pregabalin (LYRICA) capsule 75 mg (75 mg Oral $Given 10/29/24 1722)   vancomycin (VANCOCIN) 1,750 mg in 0.9% NaCl 517.5 mL intermittent infusion (0 mg Intravenous Stopped 10/29/24 2211)   oxyCODONE (ROXICODONE) tablet 5 mg (5 mg Oral $Given 10/29/24 1741)         NEW PRESCRIPTIONS STARTED AT TODAY'S ER VISIT  New Prescriptions    No medications on file          =================================================================    HPI      Emmanuel Barnett is a 59 year old male who presents to this ED for evaluation of right foot concern.  Patient presents with 2 days of redness and discoloration of his right great toes and right foot.  He has poor sensation in his leg and has no specific pain.  He has had intermittent chills, no measured fever at home.  \"This is what my other for look like " "before amputation.\"  He denies any history of wound issues on this foot prior to a couple of days ago.        VITALS:  /59   Pulse 70   Temp 100  F (37.8  C) (Oral)   Resp 20   Ht 1.753 m (5' 9\")   Wt 81.6 kg (180 lb)   SpO2 94%   BMI 26.58 kg/m      PHYSICAL EXAM    Constitutional:  in wheelchair  HENT: Normocephalic, atraumatic, mucous membranes moist, nose normal  Eyes: PERRL, EOMI, conjunctiva normal, no discharge.  Neck- Supple, gross ROM intact.   Respiratory: Normal work of breathing, normal rate, speaks in full sentences  Cardiovascular: Normal heart rate  Musculoskeletal: s/p left BKA. Toes to R foot appear black/necrotic with spreading erythema and warmth up foot and ankle. See picture below.  Neurologic: Alert & oriented x 3, cranial nerves grossly intact. Normal gross coordination  Psychiatric: Affect normal, cooperative.         LAB:  All pertinent labs reviewed and interpreted.  Labs Ordered and Resulted from Time of ED Arrival to Time of ED Departure   CBC WITH PLATELETS - Abnormal       Result Value    WBC Count 11.8 (*)     RBC Count 3.65 (*)     Hemoglobin 10.6 (*)     Hematocrit 30.9 (*)     MCV 85      MCH 29.0      MCHC 34.3      RDW 14.2      Platelet Count 384     BASIC METABOLIC PANEL - Abnormal    Sodium 130 (*)     Potassium 3.8      Chloride 84 (*)     Carbon Dioxide (CO2) 30 (*)     Anion Gap 16 (*)     Urea Nitrogen 33.8 (*)     Creatinine 5.17 (*)     GFR Estimate 12 (*)     Calcium 9.1      Glucose 454 (*)    CRP INFLAMMATION - Abnormal    CRP Inflammation 137.00 (*)    HEMOGLOBIN A1C - Abnormal    Estimated Average Glucose 329 (*)     Hemoglobin A1C 13.1 (*)    GLUCOSE BY METER - Abnormal    GLUCOSE BY METER POCT 330 (*)    LACTIC ACID WHOLE BLOOD - Normal    Lactic Acid 1.0     KETONE BETA-HYDROXYBUTYRATE QUANTITATIVE, RAPID - Normal    Ketone (Beta-Hydroxybutyrate) Quantitative <0.18     GLUCOSE MONITOR NURSING POCT   GLUCOSE MONITOR NURSING POCT   INR   PARTIAL " THROMBOPLASTIN TIME   TYPE AND SCREEN, ADULT   BLOOD CULTURE   ABO/RH TYPE AND SCREEN       RADIOLOGY:  Reviewed all pertinent imaging. Please see official radiology report.  US LOUIS Doppler No Exercise 1-2 Levels Right   Final Result   IMPRESSION:   1.  RIGHT LOWER EXTREMITY: Unable to obtain ankle brachial indices, likely related to vessel calcification. Absent digit waveform.   2.  LEFT LOWER EXTREMITY: Unable to obtain ankle brachial indices, likely related to vessel calcification. Nearly absent digit waveform.      US Lower Extremity Arterial Duplex Right   Final Result   IMPRESSION:   1.  Patent vasculature to the profunda femoral artery with multiphasic waveforms. Transition to monophasic flow in the proximal superficial femoral artery. Elevation in proximal superficial femoral artery peak systolic velocities, reflecting 50-99%    stenosis. Low velocity flow in the distal popliteal artery and infrapopliteal vessels.      MR Foot Right w/o Contrast   Final Result   IMPRESSION:   1.  Soft tissue volume loss along the distal aspects of the toes suggestive of wounds or devitalized soft tissue loss.   2.  Moderate edema throughout the foot. No abscess.   3.  No evidence of osteomyelitis. Evaluation for osteomyelitis is limited without intravenous contrast.      Foot  XR, G/E 3 views, right    (Results Pending)       EKG:    All EKG interpretations will be found in ED course above.      Salvador Chase M.D.  Emergency Medicine  [facility] EMERGENCY ROOM  [address] [place] [address] [phone]  Dept: [phone]       Salvador Chase MD  10/29/24 0721

## 2024-10-30 ENCOUNTER — APPOINTMENT (OUTPATIENT)
Dept: CT IMAGING | Facility: CLINIC | Age: 59
DRG: 299 | End: 2024-10-30
Attending: STUDENT IN AN ORGANIZED HEALTH CARE EDUCATION/TRAINING PROGRAM
Payer: MEDICARE

## 2024-10-30 ENCOUNTER — APPOINTMENT (OUTPATIENT)
Dept: ULTRASOUND IMAGING | Facility: CLINIC | Age: 59
DRG: 299 | End: 2024-10-30
Attending: STUDENT IN AN ORGANIZED HEALTH CARE EDUCATION/TRAINING PROGRAM
Payer: MEDICARE

## 2024-10-30 VITALS
SYSTOLIC BLOOD PRESSURE: 123 MMHG | DIASTOLIC BLOOD PRESSURE: 63 MMHG | TEMPERATURE: 98.8 F | HEIGHT: 69 IN | WEIGHT: 180 LBS | OXYGEN SATURATION: 92 % | RESPIRATION RATE: 17 BRPM | BODY MASS INDEX: 26.66 KG/M2 | HEART RATE: 61 BPM

## 2024-10-30 LAB
ANION GAP SERPL CALCULATED.3IONS-SCNC: 16 MMOL/L (ref 7–15)
BASOPHILS # BLD AUTO: 0.1 10E3/UL (ref 0–0.2)
BASOPHILS NFR BLD AUTO: 1 %
BUN SERPL-MCNC: 41.9 MG/DL (ref 8–23)
CALCIUM SERPL-MCNC: 9.1 MG/DL (ref 8.8–10.4)
CHLORIDE SERPL-SCNC: 88 MMOL/L (ref 98–107)
CHOLEST SERPL-MCNC: 88 MG/DL
CREAT SERPL-MCNC: 6.15 MG/DL (ref 0.67–1.17)
EGFRCR SERPLBLD CKD-EPI 2021: 10 ML/MIN/1.73M2
EOSINOPHIL # BLD AUTO: 0.2 10E3/UL (ref 0–0.7)
EOSINOPHIL NFR BLD AUTO: 2 %
ERYTHROCYTE [DISTWIDTH] IN BLOOD BY AUTOMATED COUNT: 14.2 % (ref 10–15)
GLUCOSE BLDC GLUCOMTR-MCNC: 129 MG/DL (ref 70–99)
GLUCOSE BLDC GLUCOMTR-MCNC: 263 MG/DL (ref 70–99)
GLUCOSE SERPL-MCNC: 187 MG/DL (ref 70–99)
HCO3 SERPL-SCNC: 29 MMOL/L (ref 22–29)
HCT VFR BLD AUTO: 29.2 % (ref 40–53)
HDLC SERPL-MCNC: 24 MG/DL
HGB BLD-MCNC: 9.9 G/DL (ref 13.3–17.7)
IMM GRANULOCYTES # BLD: 0 10E3/UL
IMM GRANULOCYTES NFR BLD: 0 %
LDLC SERPL CALC-MCNC: 42 MG/DL
LYMPHOCYTES # BLD AUTO: 1.3 10E3/UL (ref 0.8–5.3)
LYMPHOCYTES NFR BLD AUTO: 13 %
MCH RBC QN AUTO: 29 PG (ref 26.5–33)
MCHC RBC AUTO-ENTMCNC: 33.9 G/DL (ref 31.5–36.5)
MCV RBC AUTO: 86 FL (ref 78–100)
MONOCYTES # BLD AUTO: 0.9 10E3/UL (ref 0–1.3)
MONOCYTES NFR BLD AUTO: 9 %
NEUTROPHILS # BLD AUTO: 7.3 10E3/UL (ref 1.6–8.3)
NEUTROPHILS NFR BLD AUTO: 75 %
NONHDLC SERPL-MCNC: 64 MG/DL
NRBC # BLD AUTO: 0 10E3/UL
NRBC BLD AUTO-RTO: 0 /100
PLATELET # BLD AUTO: 338 10E3/UL (ref 150–450)
POTASSIUM SERPL-SCNC: 3.8 MMOL/L (ref 3.4–5.3)
RBC # BLD AUTO: 3.41 10E6/UL (ref 4.4–5.9)
SODIUM SERPL-SCNC: 133 MMOL/L (ref 135–145)
TRIGL SERPL-MCNC: 110 MG/DL
VANCOMYCIN SERPL-MCNC: 16.3 UG/ML
WBC # BLD AUTO: 9.8 10E3/UL (ref 4–11)

## 2024-10-30 PROCEDURE — 80202 ASSAY OF VANCOMYCIN: CPT | Performed by: EMERGENCY MEDICINE

## 2024-10-30 PROCEDURE — 250N000012 HC RX MED GY IP 250 OP 636 PS 637: Performed by: STUDENT IN AN ORGANIZED HEALTH CARE EDUCATION/TRAINING PROGRAM

## 2024-10-30 PROCEDURE — 86704 HEP B CORE ANTIBODY TOTAL: CPT | Performed by: PHYSICIAN ASSISTANT

## 2024-10-30 PROCEDURE — 82465 ASSAY BLD/SERUM CHOLESTEROL: CPT | Performed by: STUDENT IN AN ORGANIZED HEALTH CARE EDUCATION/TRAINING PROGRAM

## 2024-10-30 PROCEDURE — 99207 PR APP CREDIT; MD BILLING SHARED VISIT: CPT | Mod: FS | Performed by: PHYSICIAN ASSISTANT

## 2024-10-30 PROCEDURE — 87340 HEPATITIS B SURFACE AG IA: CPT | Performed by: PHYSICIAN ASSISTANT

## 2024-10-30 PROCEDURE — 99222 1ST HOSP IP/OBS MODERATE 55: CPT | Mod: FS | Performed by: INTERNAL MEDICINE

## 2024-10-30 PROCEDURE — 250N000011 HC RX IP 250 OP 636: Performed by: STUDENT IN AN ORGANIZED HEALTH CARE EDUCATION/TRAINING PROGRAM

## 2024-10-30 PROCEDURE — 86706 HEP B SURFACE ANTIBODY: CPT | Performed by: PHYSICIAN ASSISTANT

## 2024-10-30 PROCEDURE — 99222 1ST HOSP IP/OBS MODERATE 55: CPT | Performed by: PODIATRIST

## 2024-10-30 PROCEDURE — 93970 EXTREMITY STUDY: CPT

## 2024-10-30 PROCEDURE — 250N000013 HC RX MED GY IP 250 OP 250 PS 637: Performed by: STUDENT IN AN ORGANIZED HEALTH CARE EDUCATION/TRAINING PROGRAM

## 2024-10-30 PROCEDURE — 99239 HOSP IP/OBS DSCHRG MGMT >30: CPT | Performed by: STUDENT IN AN ORGANIZED HEALTH CARE EDUCATION/TRAINING PROGRAM

## 2024-10-30 PROCEDURE — 80048 BASIC METABOLIC PNL TOTAL CA: CPT | Performed by: STUDENT IN AN ORGANIZED HEALTH CARE EDUCATION/TRAINING PROGRAM

## 2024-10-30 PROCEDURE — G1010 CDSM STANSON: HCPCS

## 2024-10-30 PROCEDURE — 82962 GLUCOSE BLOOD TEST: CPT

## 2024-10-30 PROCEDURE — 36415 COLL VENOUS BLD VENIPUNCTURE: CPT | Performed by: STUDENT IN AN ORGANIZED HEALTH CARE EDUCATION/TRAINING PROGRAM

## 2024-10-30 PROCEDURE — 85025 COMPLETE CBC W/AUTO DIFF WBC: CPT | Performed by: STUDENT IN AN ORGANIZED HEALTH CARE EDUCATION/TRAINING PROGRAM

## 2024-10-30 RX ORDER — ALBUMIN (HUMAN) 12.5 G/50ML
50 SOLUTION INTRAVENOUS
Status: DISCONTINUED | OUTPATIENT
Start: 2024-10-30 | End: 2024-10-30 | Stop reason: HOSPADM

## 2024-10-30 RX ORDER — IOPAMIDOL 755 MG/ML
100 INJECTION, SOLUTION INTRAVASCULAR ONCE
Status: COMPLETED | OUTPATIENT
Start: 2024-10-30 | End: 2024-10-30

## 2024-10-30 RX ORDER — ASPIRIN 81 MG/1
81 TABLET ORAL DAILY
Status: DISCONTINUED | OUTPATIENT
Start: 2024-10-30 | End: 2024-10-30 | Stop reason: HOSPADM

## 2024-10-30 RX ADMIN — ATORVASTATIN CALCIUM 80 MG: 40 TABLET, FILM COATED ORAL at 08:06

## 2024-10-30 RX ADMIN — INSULIN ASPART 1 UNITS: 100 INJECTION, SOLUTION INTRAVENOUS; SUBCUTANEOUS at 08:06

## 2024-10-30 RX ADMIN — IOPAMIDOL 100 ML: 755 INJECTION, SOLUTION INTRAVENOUS at 09:05

## 2024-10-30 RX ADMIN — PREGABALIN 75 MG: 75 CAPSULE ORAL at 08:06

## 2024-10-30 RX ADMIN — ASPIRIN 81 MG: 81 TABLET, COATED ORAL at 08:06

## 2024-10-30 RX ADMIN — ESCITALOPRAM OXALATE 20 MG: 20 TABLET ORAL at 08:10

## 2024-10-30 RX ADMIN — PIPERACILLIN AND TAZOBACTAM 3.38 G: 3; .375 INJECTION, POWDER, FOR SOLUTION INTRAVENOUS at 06:33

## 2024-10-30 ASSESSMENT — ACTIVITIES OF DAILY LIVING (ADL)
ADLS_ACUITY_SCORE: 0

## 2024-10-30 NOTE — DISCHARGE SUMMARY
"[facility]  Hospitalist Discharge Summary      Date of Admission:  10/29/2024  Date of Discharge:  10/30/2024--left AGAINST MEDICAL ADVICE  Discharging Provider: LATRICE PEREIRA MD  Discharge Service: Hospitalist Service    Discharge Diagnoses   Dry gangrene of the right foot  Severe peripheral vascular disease  Occlusion of the distal right SFA  Severe stenosis of the posterior right tibial artery with occlusion.  Moderate to severe bilateral renal artery stenosis  Right lower extremity cellulitis    Clinically Significant Risk Factors     # DMII: A1C = 13.1 % (Ref range: <5.7 %) within past 6 months  # Overweight: Estimated body mass index is 26.58 kg/m  as calculated from the following:    Height as of this encounter: 1.753 m (5' 9\").    Weight as of this encounter: 81.6 kg (180 lb).           Unresulted Labs Ordered in the Past 30 Days of this Admission       Date and Time Order Name Status Description    10/30/2024 12:20 PM Hepatitis B core antibody In process     10/30/2024 12:19 PM Hepatitis B surface antigen In process     10/30/2024 12:19 PM Hepatitis B Surface Antibody In process     10/29/2024  3:09 PM Blood Culture Peripheral Blood Preliminary         These results will be followed up by nephrology team as an outpatient    Discharge Disposition   Left AGAINST MEDICAL ADVICE  Condition at discharge: Critical    Hospital Course      Emmanuel Barnett is a 59 year old male admitted on 10/29/2024. He has a past medical history significant for hypertension, hyperlipidemia, coronary artery disease, diabetes, HFpEF, atrial fibrillation, end-stage renal disease on hemodialysis, peripheral vascular disease status post left below-knee amputation who presented to the hospital with right foot necrosis and discomfort. He was diagnosed with multiple right toes dry necrosis, severe peripheral vascular disease, right lower extremity cellulitis. MRI without clear osteomyelitis. Started on " broad-spectrum antibiotic with vancomycin and Zosyn. Evaluated by podiatry. Vascular surgery consulted. CT abdomen pelvis showed occlusion of the right distal SFA and severe stenosis and occlusion of the posterior tibial artery.  Patient was planned for surgical intervention, at least partial foot amputation after clearance by vascular surgery. 10/30, patient elected to leave AGAINST MEDICAL ADVICE to go to a different hospital, [place].  He stated that he is familiar with that hospital and he would feel more comfortable receiving care there. Has capacity to make medical decisions. Understand the risks of leaving AGAINST MEDICAL ADVICE including worsening infection, sepsis and death. Confirmed that he is driving confirmed that he is going to [facility] immediately from [facility]. Scheduled for routine hemodialysis on 10/30, however, he elected not to receive it.    Uncontrolled diabetes  At home on Tresiba 50 units daily, sliding scale insulin.  Nevertheless, unclear how many units he was taking. Potentially 26 units.  Fingerstick glucose on presentation around 400. Received 18 units of Tresiba.  Fingerstick glucose currently around 100.    Plan  Further management of insulin deferred to [facility].    End-stage renal disease on hemodialysis (Monday, Wednesday, Friday)  Hyponatremia  Received his routine hemodialysis on 10/28.  Hyponatremia most likely due to end-stage renal disease.  Patient elected not to receive his routine hemodialysis on 10/30.  He left AGAINST MEDICAL ADVICE.    Plan  Educated about the importance of getting his hemodialysis as soon as possible whenever he gets to [facility].    Atrial fibrillation  Hypertension  At home on metoprolol succinate 25 mg daily    Plan  Continue home metoprolol.    Peripheral neuropathy    Plan  Continue home Lyrica 75 mg twice daily      Insomnia  Was resumed on a lower dose of trazodone during this hospitalization.        Consultations This Hospital  Stay   PHARMACY TO DOSE VANCO  PODIATRY IP CONSULT  PHARMACY TO DOSE VANCO  NEPHROLOGY IP CONSULT  VASCULAR SURGERY IP CONSULT  CARE MANAGEMENT / SOCIAL WORK IP CONSULT    Code Status   Full Code    Time Spent on this Encounter   I, LATRICE PEREIRA MD, personally saw the patient today and spent greater than 30 minutes discharging this patient.       LATRICE PEREIRA MD  [facility] EMERGENCY ROOM  [facility] [facility] [address]  Phone: [phone]  Fax: [phone]  ______________________________________________________________________    Physical Exam   Vital Signs: Temp: 98.8  F (37.1  C) Temp src: Oral BP: 123/63 Pulse: 61   Resp: 17 SpO2: 92 % O2 Device: None (Room air)    Weight: 180 lbs 0 oz  Physical Exam  Constitutional:       Appearance: He is not toxic-appearing.   Pulmonary:      Effort: Pulmonary effort is normal.   Musculoskeletal:      Comments: Dry gangrene of the second, third and fourth right toes.  Erythema extending from the right foot to the distal right shin.  No tenderness to patient    Neurological:      Mental Status: He is alert.   Psychiatric:         Mood and Affect: Mood normal.         Thought Content: Thought content normal.             Primary Care Physician   Iliana Anderson    Discharge Orders   No discharge procedures on file.    Significant Results and Procedures   Most Recent 3 CBC's:  Recent Labs   Lab Test 10/30/24  0631 10/29/24  1600 04/03/23  0458 03/08/23  0700 11/17/22  1244   WBC 9.8 11.8*  --   --  8.3   HGB 9.9* 10.6* 7.7*   < > 10.9*   MCV 86 85  --   --  89    384  --   --  331    < > = values in this interval not displayed.     Most Recent 3 BMP's:  Recent Labs   Lab Test 10/30/24  1147 10/30/24  0631 10/30/24  0209 10/29/24  2216 10/29/24  1600 04/03/23  0458   NA  --  133*  --   --  130* 137   POTASSIUM  --  3.8  --   --  3.8 5.1   CHLORIDE  --  88*  --   --  84* 97*   CO2  --  29  --   --  30* 24   BUN  --  41.9*  --    --  33.8* 75.9*   CR  --  6.15*  --   --  5.17* 5.92*   ANIONGAP  --  16*  --   --  16* 16*   KAYDEN  --  9.1  --   --  9.1 7.9*   * 187* 263*   < > 454* 99    < > = values in this interval not displayed.   ,   Results for orders placed or performed during the hospital encounter of 10/29/24   MR Foot Right w/o Contrast    Narrative    EXAM: MR FOOT RIGHT W/O CONTRAST  LOCATION: [facility]  DATE: 10/29/2024    INDICATION: Osteomyelitis right foot.  COMPARISON: None.  TECHNIQUE: Unenhanced.    FINDINGS:     Evaluation for osteomyelitis is limited without intravenous contrast.    Extensive volume loss of the soft tissues along the distal aspects of the 2nd, 3rd, and 4th toes which is likely suggestive of wound or devitalized soft tissue loss. Moderate soft tissue edema along the dorsal aspect of the great toe and throughout the   foot. No discrete fluid collection to suggest abscess.    No intramedullary edema with corresponding T1 hypointense signal change to suggest osteomyelitis.    Extensive fatty infiltration identified of the visualized foot musculature. Severe degenerative changes of the midfoot and moderate degenerative changes throughout the forefoot and interphalangeal joints.       Impression    IMPRESSION:  1.  Soft tissue volume loss along the distal aspects of the toes suggestive of wounds or devitalized soft tissue loss.  2.  Moderate edema throughout the foot. No abscess.  3.  No evidence of osteomyelitis. Evaluation for osteomyelitis is limited without intravenous contrast.   US Lower Extremity Arterial Duplex Right    Narrative    EXAM: US LOWER EXTREMITY ARTERIAL DUPLEX RIGHT  LOCATION: [facility]  DATE: 10/29/2024    INDICATION: Lower extremity pain, decreased lower extremity pulses.  COMPARISON: None.  TECHNIQUE: Duplex utilizing 2D gray-scale imaging, Doppler interrogation with color-flow and spectral waveform  analysis.    FINDINGS:    RIGHT LOWER EXTREMITY ARTERIAL ASSESSMENT:  EIA: 60/7 cm/s, T  Common femoral artery: 65/9 cm/s, T  Profunda femoris artery (proximal): 67/8 cm/s, T  SFA (proximal): 165/24 cm/s, M  SFA (mid): 62/10 cm/s, M  SFA (distal): 36/10 cm/s, M  Popliteal artery (proximal): 78/13 cm/s, M  Popliteal artery (distal): 17/6 cm/s, M  Anterior tibial artery: 5/- cm/s, M  Posterior tibial artery: 14/8 cm/s, M  Dorsalis pedis artery: 5/- cm/s, M      Impression    IMPRESSION:  1.  Patent vasculature to the profunda femoral artery with multiphasic waveforms. Transition to monophasic flow in the proximal superficial femoral artery. Elevation in proximal superficial femoral artery peak systolic velocities, reflecting 50-99%   stenosis. Low velocity flow in the distal popliteal artery and infrapopliteal vessels.   US LOUIS Doppler No Exercise 1-2 Levels Right    Narrative    EXAM: RESTING ANKLE-BRACHIAL INDICES (ABIs)  LOCATION: [facility]  DATE: 10/29/2024    INDICATION: Decreased lower extremity pulses, lower extremity pain  COMPARISON: None.    LOUIS FINDINGS:      Impression    IMPRESSION:  1.  RIGHT LOWER EXTREMITY: Unable to obtain ankle brachial indices, likely related to vessel calcification. Absent digit waveform.  2.  LEFT LOWER EXTREMITY: Unable to obtain ankle brachial indices, likely related to vessel calcification. Nearly absent digit waveform.   US Lower Extremity Venous Mapping Bilateral    Narrative    EXAM: US LOWER EXTREMITY VENOUS MAPPING BILATERAL  LOCATION: [facility]  DATE: 10/30/2024    INDICATION: PAD. eval for possible bypass conduit. Left below-knee amputation.  COMPARISON: None.  TECHNIQUE: Ultrasound examination of the lower extremity veins was performed, including gray-scale and compression imaging.     LOWER  EXTREMITY FINDINGS:       VENOUS DIAMETERS  RIGHT GREAT SAPHENOUS VEIN  Upper Thigh: 5 mm  Mid Thigh: 4 mm  Lower Thigh: 5 mm  Knee:  4 mm  Upper Calf: 3 mm  Mid Calf: 3 mm  Lower Calf: 3 mm  Ankle: 2 mm    Vein wall thickening right great saphenous vein proximal calf to distal calf consistent with right chronic superficial thrombophlebitis.    LEFT GREAT SAPHENOUS VEIN  Upper Thigh: 7 mm  Mid Thigh: 4 mm  Lower Thigh: 3 mm  Knee: 3 mm    RIGHT SMALL SAPHENOUS VEIN  Upper Calf: 2 mm  Mid Calf: 1 mm  Lower Calf: 1 mm  Ankle: 1 mm        Impression    IMPRESSION:  1. Bilateral lower extremity venous mapping.   CTA Abdomen Pelvis Runoff w Contrast    Narrative    EXAM: CTA ABDOMEN PELVIS RUNOFF W CONTRAST  LOCATION: [facility]  DATE/TIME: 10/30/2024 9:29 AM CDT    INDICATION: right foot chronic limb threatening ischemia  COMPARISON: Ultrasound right lower extremity arterial duplex 10/29/2024.  TECHNIQUE: CT angiogram acquisition through the abdomen, pelvis and bilateral lower extremities was performed during the arterial phase of contrast enhancement. 2D and 3D reconstructions performed by the CT technologist. Dose reduction techniques were   used.  CONTRAST: Isovue 370 100mL    ABDOMEN ARTERIAL FINDINGS  Abdominal aorta: Patent, nonaneurysmal, moderate to severe atherosclerotic calcifications.  Celiac artery: Patent. Mild to moderate ostial stenosis  SMA: Patent.  Renal Arteries: Moderate to severe ostial stenosis of the bilateral renal arteries, left worse than right  MARIBELL: Patent.    RIGHT LOWER EXTREMITY ARTERIAL FINDINGS  Common Iliac: Densely calcified, mild stenosis.  External Iliac: Patent.  Internal Iliac: Moderate ostial stenosis, dense calcifications.  Common Femoral: Patent., Dense calcifications, no high-grade stenosis  Profunda: Duplicated, both patent. Moderate to severe stenosis of the more lateral profunda femoral artery proximally.. Multifocal mild to moderate stenosis of the more medial profunda femoral artery.   SFA: Densely calcified and patent proximally. Multifocal mild to moderate stenosis in the  proximal to mid vessel. Occluded distally..  Popliteal: Densely calcified. Reconstitution of flow in the P1/P2 popliteal artery. Severe stenosis throughout the patent portion of the popliteal artery..  Tibioperoneal trunk: Multifocal moderate to severe stenosis, patent.  Anterior tibial: Occluded at the origin. Small patent portion just distal to the origin. Otherwise occluded to the ankle.  Peroneal: Patent to the foot.  Posterior tibial: Severe stenosis versus occlusion proximally. Reconstitution of flow in the proximal calf. Multifocal moderate to severe stenosis. Patent to the foot.    LEFT LOWER EXTREMITY ARTERIAL FINDINGS  Common Iliac: Patent, densely calcified. Distal vessel is ectatic measuring 1.5 cm.  External Iliac: Patent.  Internal Iliac: Patent.  Common Femoral: Patent.  Profunda: Duplicated, both are patent.  SFA: Multifocal mild to moderate stenosis in the proximal to mid vessel. Occlusion of the distal SFA with bulky calcification.  Popliteal: Reconstitution of flow in the P2 popliteal artery. Dense calcifications.    NON-ARTERIAL FINDINGS  Limited evaluation of the solid intra-peritoneal organs and bowel given the arterial timing of this exam. Within this limitation, atelectasis in the lung bases. Trace left pleural effusion..  Hepatomegaly measuring 22 cm craniocaudal. Spleen is mildly enlarged measuring 13.5 cm.  Mild to moderate bilateral renal parenchymal atrophy. Subcentimeter right midpole renal lesion, too small to characterize.  Coarse calcifications in posterior calf musculature, possibly posttraumatic.  Left below knee amputation.      Impression    IMPRESSION:  1.  RIGHT LOWER EXTREMITY: Occlusion of the distal SFA. Reconstitution of flow in the popliteal artery, the patent portion of the popliteal artery is severely stenotic. Two-vessel runoff to the foot via the peroneal and posterior tibial arteries. The   posterior tibial artery is severely stenotic versus occluded proximally, the  remainder of the vessel is patent..  2.  LEFT LOWER EXTREMITY: Post surgical changes of left below-knee amputation. Left iliac arteries, CFA, and profunda femoral arteries are patent without high-grade stenosis. Occlusion of the distal SFA with bulky calcification. Reconstitution of flow in   the P2 popliteal artery..  3.  Moderate to severe bilateral renal artery stenosis, left worse than right. Mild to moderate bilateral renal parenchymal atrophy.  4.  Hepatosplenomegaly       Discharge Medications   Current Discharge Medication List        CONTINUE these medications which have NOT CHANGED    Details   aspirin 81 mg chewable tablet Take 81 mg by mouth daily.      atorvastatin (LIPITOR) 80 MG tablet Take 80 mg by mouth daily.      escitalopram (LEXAPRO) 20 MG tablet Take 20 mg by mouth every morning.      insulin aspart (NOVOLOG PEN) 100 UNIT/ML pen Take 1 unit per 5 grams of carbs with meals (if carb count unknown take 15 units)      insulin degludec (TRESIBA FLEXTOUCH) 100 UNIT/ML pen Inject 50 Units Subcutaneous      metoprolol succinate ER (TOPROL XL) 25 MG 24 hr tablet Take 25 mg by mouth daily.      midodrine (PROAMATINE) 5 MG tablet Take 5 mg by mouth See Admin Instructions. Take one tablet by mouth before dialysis      nitroGLYcerin (NITROSTAT) 0.4 MG sublingual tablet Place 0.4 mg under the tongue      pregabalin (LYRICA) 75 MG capsule Take 75 mg by mouth 2 times daily.      sevelamer carbonate (RENVELA) 800 MG tablet Take 2,400 mg by mouth 3 times daily (with meals).      traZODone (DESYREL) 150 MG tablet Take 300 mg by mouth at bedtime.           Allergies   No Known Allergies

## 2024-10-30 NOTE — H&P
[facility]    History and Physical - Hospitalist Service       Date of Admission:  10/29/2024    Assessment & Plan      Emmanuel Barnett is a 59 year old male admitted on 10/29/2024.  He has a past medical history significant for hypertension, hyperlipidemia, coronary artery disease, diabetes, HFpEF, atrial fibrillation, end-stage renal disease on hemodialysis, peripheral vascular disease status post left below-knee amputation who presented to the hospital with right foot necrosis and discomfort.    Peripheral vascular disease  Right foot necrosis  Status post left knee below-knee amputation  Presenting with 1 week of worsening multiple right toes discoloration.  No significant signs of systemic infection.  MRI without contrast without signs of osteomyelitis.  10/29, physical exam showed cleared necrosis of the right second, third and fourth toes.  Per ED physician, podiatry was consulted and they will see the patient on 10/30.  Will most likely require surgical intervention.    Plan  Continue broad-spectrum antibiotic with vancomycin and Zosyn  Agree with US Doppler ultrasound of right lower extremity  Consult podiatry  N.p.o. after midnight  Type and screen  Pain management with as needed Tylenol, Dilaudid and oxycodone  Might need vascular involvement will discuss with podiatry    Uncontrolled diabetes  At home on Tresiba 50 units daily, sliding scale insulin.  Nevertheless, unclear how many units he was taking.  Potentially 26 units.  Fingerstick glucose around 400.    Plan  Start Tresiba 18 units tonight  Sliding scale insulin  Continue to monitor and adjust as needed    End-stage renal disease on hemodialysis (Monday, Wednesday, Friday)  Hyponatremia  Received his routine hemodialysis on 10/28.  Hyponatremia most likely due to end-stage renal disease.    Plan  Consults nephrology to arrange for hemodialysis while inpatient  Continue midodrine before dialysis    Atrial  "fibrillation  Hypertension  At home on metoprolol succinate 25 mg daily    Plan  Hold for tonight    Peripheral neuropathy    Plan  Continue home Lyrica 75 mg twice daily      Insomnia    Plan  Continue home trazodone on a lower dose of 150 mg nightly (watch potential side effects with other opioids)                Diet: Combination Diet Regular Diet    DVT Prophylaxis: Pneumatic Compression Devices  Villa Catheter: Not present  Lines: None     Cardiac Monitoring: None  Code Status:  Full code    Clinically Significant Risk Factors Present on Admission         # Hyponatremia: Lowest Na = 130 mmol/L in last 2 days, will monitor as appropriate  # Hypochloremia: Lowest Cl = 84 mmol/L in last 2 days, will monitor as appropriate        # Drug Induced Platelet Defect: home medication list includes an antiplatelet medication   # Hypertension: Noted on problem list    # Anemia: based on hgb <11       # Overweight: Estimated body mass index is 26.58 kg/m  as calculated from the following:    Height as of this encounter: 1.753 m (5' 9\").    Weight as of this encounter: 81.6 kg (180 lb).              Disposition Plan     Medically Ready for Discharge: Anticipated in 2-4 Days           LATRICE PEREIRA MD  Hospitalist Service  [facility]  Securely message with [employer] (more info)  Text page via [facility] Paging/Directory     ______________________________________________________________________    Chief Complaint   Necrosis of the right toes     History is obtained from the patient    History of Present Illness   Emmanuel Barnett is a 59 year old male admitted on 10/29/2024.  He has a past medical history significant for hypertension, hyperlipidemia, coronary artery disease, diabetes, HFpEF, atrial fibrillation, end-stage renal disease on hemodialysis, peripheral vascular disease status post left below-knee amputation who presented to the hospital with right foot necrosis and discomfort.    Per patient, he " was in his usual state of health until around a week ago.  He checked on his right foot when he took his socks and appeared normal.  On 10/28, he checked on his right foot again and he noticed discoloration of his toes.  He has poor sensation in his lower extremities, however, he noticed some discomfort.  He denies any fever or chills.  Denies any chest pain or shortness of breath.  Denies any nausea or vomiting.    Had his routine hemodialysis on 10/28.        Past Medical History    Past Medical History:   Diagnosis Date    Diabetes (H) Type 2     Hypertension        Past Surgical History   Past Surgical History:   Procedure Laterality Date    CARDIAC SURGERY      stents       Prior to Admission Medications   Prior to Admission Medications   Prescriptions Last Dose Informant Patient Reported? Taking?   aspirin 81 mg chewable tablet Past Week  Yes Yes   Sig: Take 81 mg by mouth daily.   atorvastatin (LIPITOR) 80 MG tablet Past Week  Yes Yes   Sig: Take 80 mg by mouth daily.   escitalopram (LEXAPRO) 20 MG tablet   Yes Yes   Sig: Take 20 mg by mouth every morning.   insulin aspart (NOVOLOG PEN) 100 UNIT/ML pen Past Month  Yes Yes   Sig: Take 1 unit per 5 grams of carbs with meals (if carb count unknown take 15 units)   insulin degludec (TRESIBA FLEXTOUCH) 100 UNIT/ML pen Past Month  Yes Yes   Sig: Inject 50 Units Subcutaneous   metoprolol succinate ER (TOPROL XL) 25 MG 24 hr tablet Past Month  Yes Yes   Sig: Take 25 mg by mouth daily.   midodrine (PROAMATINE) 5 MG tablet Unknown  Yes Yes   Sig: Take 5 mg by mouth See Admin Instructions. Take one tablet by mouth before dialysis   nitroGLYcerin (NITROSTAT) 0.4 MG sublingual tablet More than a month  Yes Yes   Sig: Place 0.4 mg under the tongue   pregabalin (LYRICA) 75 MG capsule Past Month  Yes Yes   Sig: Take 75 mg by mouth 2 times daily.   sevelamer carbonate (RENVELA) 800 MG tablet 10/28/2024 Evening  Yes Yes   Sig: Take 2,400 mg by mouth 3 times daily (with meals).    traZODone (DESYREL) 150 MG tablet 10/28/2024 Bedtime  Yes Yes   Sig: Take 300 mg by mouth at bedtime.      Facility-Administered Medications: None        Physical Exam   Vital Signs: Temp: 100  F (37.8  C) Temp src: Oral BP: 122/59 Pulse: 70   Resp: 20 SpO2: 94 % O2 Device: None (Room air)    Weight: 180 lbs 0 oz    Physical Exam  Constitutional:       General: He is not in acute distress.     Appearance: He is not ill-appearing or toxic-appearing.   Cardiovascular:      Rate and Rhythm: Normal rate.   Pulmonary:      Effort: Pulmonary effort is normal. No respiratory distress.   Musculoskeletal:      Comments: Necrosis of the second, third and fourth right toe.  Mild erythema of the right foot without significant warmth or tenderness to palpation.   Neurological:      Mental Status: He is alert.   Psychiatric:         Mood and Affect: Mood normal.          Medical Decision Making       80 MINUTES SPENT BY ME on the date of service doing chart review, history, exam, documentation & further activities per the note.      Data     I have personally reviewed the following data over the past 24 hrs:    11.8 (H)  \   10.6 (L)   / 384     130 (L) 84 (L) 33.8 (H) /  454 (H)   3.8 30 (H) 5.17 (H) \     Procal: N/A CRP: 137.00 (H) Lactic Acid: 1.0         Imaging results reviewed over the past 24 hrs:   Recent Results (from the past 24 hours)   MR Foot Right w/o Contrast    Narrative    EXAM: MR FOOT RIGHT W/O CONTRAST  LOCATION: [facility]  DATE: 10/29/2024    INDICATION: Osteomyelitis right foot.  COMPARISON: None.  TECHNIQUE: Unenhanced.    FINDINGS:     Evaluation for osteomyelitis is limited without intravenous contrast.    Extensive volume loss of the soft tissues along the distal aspects of the 2nd, 3rd, and 4th toes which is likely suggestive of wound or devitalized soft tissue loss. Moderate soft tissue edema along the dorsal aspect of the great toe and throughout the   foot. No discrete  fluid collection to suggest abscess.    No intramedullary edema with corresponding T1 hypointense signal change to suggest osteomyelitis.    Extensive fatty infiltration identified of the visualized foot musculature. Severe degenerative changes of the midfoot and moderate degenerative changes throughout the forefoot and interphalangeal joints.       Impression    IMPRESSION:  1.  Soft tissue volume loss along the distal aspects of the toes suggestive of wounds or devitalized soft tissue loss.  2.  Moderate edema throughout the foot. No abscess.  3.  No evidence of osteomyelitis. Evaluation for osteomyelitis is limited without intravenous contrast.

## 2024-10-30 NOTE — PHARMACY-VANCOMYCIN DOSING SERVICE
"Pharmacy Vancomycin Initial Note  Date of Service 2024  Patient's  1965  59 year old, male    Indication: Skin and Soft Tissue Infection    Current estimated CrCl = Estimated Creatinine Clearance: 17.3 mL/min (A) (based on SCr of 5.17 mg/dL (H)).    Creatinine for last 3 days  10/29/2024:  4:00 PM Creatinine 5.17 mg/dL    Recent Vancomycin Level(s) for last 3 days  No results found for requested labs within last 3 days.      Vancomycin IV Administrations (past 72 hours)                     vancomycin (VANCOCIN) 1,750 mg in 0.9% NaCl 517.5 mL intermittent infusion (mg) 1,750 mg New Bag 10/29/24 1854                    Nephrotoxins and other renal medications (From now, onward)      Start     Dose/Rate Route Frequency Ordered Stop    10/30/24 0600  piperacillin-tazobactam (ZOSYN) 3.375 g vial to attach to  mL bag        Note to Pharmacy: For [facility], [facility] and [facility]: For Zosyn-naive patients, use the \"Zosyn initial dose + extended infusion\" order panel.    3.375 g  over 240 Minutes Intravenous EVERY 12 HOURS 10/29/24 2037      10/29/24 2038  vancomycin place harley - receiving intermittent dosing         1 each Intravenous SEE ADMIN INSTRUCTIONS 10/29/24 2038              Contrast Orders - past 72 hours (72h ago, onward)      None                  Plan:  Patient given vancomycin loading dose in ED 1,750 mg IV x 1 (load of ~ 20 mg/kg) this pm.   Vancomycin monitoring method: Renal Replacement Therapy  Vancomycin therapeutic monitoring goal: 10-15 mg/L  Pharmacy will check vancomycin levels as appropriate tomorrow am (10/30) before dialysis and base subsequent doses off of this level.   Serum creatinine levels will be ordered daily for the first week of therapy and at least twice weekly for subsequent weeks.      Thank you for the consult.   Colleen Lamb, PharmD, BCPS     "

## 2024-10-30 NOTE — PROGRESS NOTES
Patient leaving AMA. MD spoke with patient. Patient aware that dialysis was coming at 1400.  IV removed. Patient arranged own transport to go to [facility].

## 2024-10-30 NOTE — CONSULTS
RENAL CONSULT NOTE    REQUESTING PHYSICIAN: Dr. Mayen    REASON FOR CONSULT: ESRD     ASSESSMENT/PLAN:    ESRD - Dialyzes MWF at [facility] under the care of Dr. Olsen; planning to maintain his usual MWF schedule this admission     Access - LUE AVF, no recent issues      Renal hypertension - Struggles with intradialytic hypotension requiring midodrine to support UF; not on any scheduled antihypertensives     Secondary renal hyperparathyroidism - On sevelamer for binders; chronically poor control of phos and PTH     Anemia of chronic disease - Hgb here in 9-10 range, will consider EPO dosing this admission if prolonged stay expected     Hyponatremia - mild, follow with UF on HD     PVD - s/p left BKA; now with necrosis of right toes and planning for possible partial foot amputation vs BKA pending vascular surgery input; no evidence of OM based on MRI but currently covering with Vanc/Zosyn per [facility]     Type 2 diabetes - insulin per primary team     HPI: Emmanuel Barnett is a 58 yo M with PMH significant for ESRD on HD, hypertension, diabetes, PVD, chronic anemia who is admitted with chief complaint of right foot pain and dusky toes. Evaluated by podiatry and toes felt to be necrotic/non-viable but no evidence of osteomyelitis or other infection. Surgical intervention is being considered pending input from vascular surgery. Nephrology is consulted for management of ESRD and associated co-morbidities.     Seen in ED. Notes that necrotic appearance of toes started a few days ago. Toes are now black. Denies any pain with this. No issues with dialysis recently aside from his chronic issue with IDH. AVF working well. No breathing concerns. Says he has not eaten or drank anything recently and doesn't want to UF much, but discussed that if he is not eating well we may try to challenge his TW down some. He is agreeable to this. He is wondering about surgery plans; expect there will be a CT angiogram; no  objection to contrast from nephrology perspective, he makes very little urine.      REVIEW OF SYSTEMS:  Comprehensive ROS negative except per HPI     ALLERGIES/SENSITIVITIES:  No Known Allergies  Social History     Tobacco Use    Smoking status: Former     Current packs/day: 0.00     Average packs/day: 1.5 packs/day for 15.0 years (22.5 ttl pk-yrs)     Types: Cigarettes     Start date:      Quit date: 2017     Years since quittin.8    Smokeless tobacco: Never   Substance Use Topics    Alcohol use: Not Currently    Drug use: Never             PHYSICAL EXAM:  Physical Exam   Temp: 98.8  F (37.1  C) Temp src: Oral BP: 123/63 Pulse: 61   Resp: 17 SpO2: 92 % O2 Device: None (Room air)    Vitals:    10/29/24 1326   Weight: 81.6 kg (180 lb)     Vital Signs with Ranges  Temp:  [98  F (36.7  C)-100  F (37.8  C)] 98.8  F (37.1  C)  Pulse:  [54-70] 61  Resp:  [16-20] 17  BP: (111-142)/(56-69) 123/63  SpO2:  [92 %-98 %] 92 %  I/O last 3 completed shifts:  In: 100 [IV Piggyback:100]  Out: -     @TMAXR(24)@    Patient Vitals for the past 72 hrs:   Weight   10/29/24 1326 81.6 kg (180 lb)       General - A & O x 3, NAD  HEENT - PERRLA, no scleral icterus  Neck - no carotid bruits, no JVD  Respiratory - Lungs CTA bilat without wheeze, rhonchi, rales  Cardiovascular - AP RRR with murmur  Abdomen - soft, BS present, no bruits, no fluid wave  Extremities - Left BKA, right foot with necrotic appearance of toes 2&3   Integumentary - intact, good turgor, no rash/lesions  Neurologic - grossly intact  Psych:  Judgement intact, affect WNL  Access: LUE AVF + bruit and thrill     Laboratory:     Recent Labs   Lab 10/30/24  0631 10/29/24  1600   WBC 9.8 11.8*   RBC 3.41* 3.65*   HGB 9.9* 10.6*   HCT 29.2* 30.9*    384       Basic Metabolic Panel:  Recent Labs   Lab 10/30/24  0631 10/30/24  0209 10/29/24  2216 10/29/24  1600   *  --   --  130*   POTASSIUM 3.8  --   --  3.8   CHLORIDE 88*  --   --  84*   CO2 29  --   --  30*    BUN 41.9*  --   --  33.8*   CR 6.15*  --   --  5.17*   * 263* 330* 454*   KAYDEN 9.1  --   --  9.1       INR  Recent Labs   Lab 10/29/24  2243   INR 1.35*       Recent Labs   Lab Test 10/30/24  0631 10/29/24  1600   POTASSIUM 3.8 3.8   CHLORIDE 88* 84*   BUN 41.9* 33.8*      Recent Labs   Lab Test 12/06/23  1140 11/17/22  1244   ALBUMIN  --  4.2   BILITOTAL  --  0.4   ALT  --  19   AST  --  16   PROTEIN 300*  --        Personally reviewed today's laboratory studies      Thank you for involving us in the care of this patient. We will continue to follow along with you.    Jenny Crook PA-C   [facility]  [phone]

## 2024-10-30 NOTE — PLAN OF CARE
Problem: Adult Inpatient Plan of Care  Goal: Optimal Comfort and Wellbeing  Outcome: Progressing     AO x4. VSS on RA. Pt brought home cpap and wore when sleeping. IV SL. Denies pain. Numbness tingling baseline. Dorsal pulse RLE +1. NPO for possible surgery in the AM. Makes needs known. Call light within reach.

## 2024-10-30 NOTE — CONSULTS
Consultation - Foot and Ankle/Podiatry  Emmanuel Barnett,  1965, MRN [id number]    Admitting Dx: Right foot infection    PCP: Iliana Anderson, [phone]   Code status:  Full Code       Extended Emergency Contact Information  Primary Emergency Contact: Estefany Scott   [place]  Mobile Phone: [phone]  Relation: Daughter       ASSESSMENT   Dry gangrene right foot  Active Problems:    Right foot infection       PLAN   -I discussed with the patient that the majority of the digits 2, 3 on his right foot have necrotic/nonviable tissue with smaller areas of necrotic/nonviable tissue on the remaining digits 1, 4, 5 on the right foot.  No areas of erythema or fluctuance identified right foot.  WBC 11.8.  Afebrile.  Hemoglobin A1c 13.1%.    MRI right foot:1.  Soft tissue volume loss along the distal aspects of the toes suggestive of wounds or devitalized soft tissue loss.  2.  Moderate edema throughout the foot. No abscess.  3.  No evidence of osteomyelitis. Evaluation for osteomyelitis is limited without intravenous contrast.    LOUIS:1.  RIGHT LOWER EXTREMITY: Unable to obtain ankle brachial indices, likely related to vessel calcification. Absent digit waveform.  2.  LEFT LOWER EXTREMITY: Unable to obtain ankle brachial indices, likely related to vessel calcification. Nearly absent digit waveform.    -I reviewed the above with the patient today and discussed that there is no urgent need for surgical intervention at this time as MRI is negative for osteomyelitis and no abscess identified on clinical exam this morning.    -We did discuss that at minimum partial foot amputation would be necessary, however salvage of the right lower extremity may not be possible given lack of blood flow to the right foot.  I have asked vascular surgery for consultation and treatment recommendations.    -Medical management per hospitalist.  Continue Zosyn/Vanco.  I will await input from vascular surgery.     Thank you for  the consultation. I will follow.     Max Levine DPM  [facility] Podiatry/Foot & Ankle Surgery  On-Call: [phone]  ______________________________________________________________________        Reason For Consult: Dry gangrene right foot       HPI    I have been requested by hospital service to evaluate Emmanuel Barnett who is a 59 year old year old male for the above. The patient was admitted to [facility] for gangrene of the right foot.  Patient reports that he just recently noticed discoloration along the central digits on the right foot.  Denies any known trauma.  Patient underwent previous BKA on the left lower extremity.  Patient states that he quit smoking in 2017.  Past medical history significant for type 2 diabetes, ESRD.  MRI of the right foot and ABIs have been obtained during this hospitalization.         Medical History  Past Medical History:   Diagnosis Date    Diabetes (H) Type 2     Hypertension      Surgical History  He  has a past surgical history that includes Cardiac surgery.   Social History  Reviewed, and he  reports that he quit smoking about 7 years ago. His smoking use included cigarettes. He started smoking about 22 years ago. He has a 22.5 pack-year smoking history. He has never used smokeless tobacco. He reports that he does not currently use alcohol. He reports that he does not use drugs.   Allergies  No Known Allergies Family History  family history is not on file.  family history not pertinent to presenting problem.    Psychosocial Needs  Social History     Social History Narrative    Not on file     Additional psychosocial needs reviewed per nursing assessment.       Prior to Admission Medications   (Not in a hospital admission)         Imaging: reviewed images          Review of Systems:  All other systems negative in detail except what is noted in HPI.  Physical Exam:  Temp:  [98  F (36.7  C)-100  F (37.8  C)] 98  F (36.7  C)  Pulse:  [54-70] 70  Resp:   "[16-20] 16  BP: (111-142)/(56-69) 139/69  SpO2:  [93 %-98 %] 94 %    General appearance: Patient is alert and fully cooperative with history & exam.  No sign of distress is noted during the visit.  Psychiatric: Affect is pleasant & appropriate.  Patient appears motivated to improve health.  Respiratory: Breathing is regular & unlabored while sitting.  HEENT: Hearing is intact to spoken word.  Speech is clear.  No gross evidence of visual impairment that would impact ambulation.    Vascular: Dorsalis pedis and posterior tibial pulses are nonpalpable right foot.  No edema identified right foot.  Dermatologic: Majority of digits 2, 3 are necrotic/nonviable with smaller areas of necrotic/nonviable tissue along remaining digits 1, 4, 5 right foot.  No erythema right foot.  No areas of fluctuance identified right foot.  Neurologic: Diminished light touch right foot.  Musculoskeletal: Left BKA.     /69 (BP Location: Right arm)   Pulse 70   Temp 98  F (36.7  C) (Oral)   Resp 16   Ht 1.753 m (5' 9\")   Wt 81.6 kg (180 lb)   SpO2 94%   BMI 26.58 kg/m      BMI= Body mass index is 26.58 kg/m .    Pertinent Labs    [unfilled]       Recent Labs   Lab 10/29/24  1600   *   CO2 30*   BUN 33.8*       Lab Results   Component Value Date    ALT 19 11/17/2022    AST 16 11/17/2022    ALKPHOS 161 (H) 11/17/2022        No results found for: \"CRP\"   [unfilled]     Pertinent Radiology  Radiology Results: Reviewed  US Lower Extremity Arterial Duplex Right    Result Date: 10/29/2024  EXAM: US LOWER EXTREMITY ARTERIAL DUPLEX RIGHT LOCATION: [facility] DATE: 10/29/2024 INDICATION: Lower extremity pain, decreased lower extremity pulses. COMPARISON: None. TECHNIQUE: Duplex utilizing 2D gray-scale imaging, Doppler interrogation with color-flow and spectral waveform analysis. FINDINGS: RIGHT LOWER EXTREMITY ARTERIAL ASSESSMENT: EIA: 60/7 cm/s, T Common femoral artery: 65/9 cm/s, T Profunda femoris artery " (proximal): 67/8 cm/s, T SFA (proximal): 165/24 cm/s, M SFA (mid): 62/10 cm/s, M SFA (distal): 36/10 cm/s, M Popliteal artery (proximal): 78/13 cm/s, M Popliteal artery (distal): 17/6 cm/s, M Anterior tibial artery: 5/- cm/s, M Posterior tibial artery: 14/8 cm/s, M Dorsalis pedis artery: 5/- cm/s, M     IMPRESSION: 1.  Patent vasculature to the profunda femoral artery with multiphasic waveforms. Transition to monophasic flow in the proximal superficial femoral artery. Elevation in proximal superficial femoral artery peak systolic velocities, reflecting 50-99% stenosis. Low velocity flow in the distal popliteal artery and infrapopliteal vessels.    US LOUIS Doppler No Exercise 1-2 Levels Right    Result Date: 10/29/2024  EXAM: RESTING ANKLE-BRACHIAL INDICES (ABIs) LOCATION: [facility] DATE: 10/29/2024 INDICATION: Decreased lower extremity pulses, lower extremity pain COMPARISON: None. LOUIS FINDINGS:     IMPRESSION: 1.  RIGHT LOWER EXTREMITY: Unable to obtain ankle brachial indices, likely related to vessel calcification. Absent digit waveform. 2.  LEFT LOWER EXTREMITY: Unable to obtain ankle brachial indices, likely related to vessel calcification. Nearly absent digit waveform.    MR Foot Right w/o Contrast    Result Date: 10/29/2024  EXAM: MR FOOT RIGHT W/O CONTRAST LOCATION: [facility] DATE: 10/29/2024 INDICATION: Osteomyelitis right foot. COMPARISON: None. TECHNIQUE: Unenhanced. FINDINGS: Evaluation for osteomyelitis is limited without intravenous contrast. Extensive volume loss of the soft tissues along the distal aspects of the 2nd, 3rd, and 4th toes which is likely suggestive of wound or devitalized soft tissue loss. Moderate soft tissue edema along the dorsal aspect of the great toe and throughout the foot. No discrete fluid collection to suggest abscess. No intramedullary edema with corresponding T1 hypointense signal change to suggest osteomyelitis. Extensive fatty  infiltration identified of the visualized foot musculature. Severe degenerative changes of the midfoot and moderate degenerative changes throughout the forefoot and interphalangeal joints.     IMPRESSION: 1.  Soft tissue volume loss along the distal aspects of the toes suggestive of wounds or devitalized soft tissue loss. 2.  Moderate edema throughout the foot. No abscess. 3.  No evidence of osteomyelitis. Evaluation for osteomyelitis is limited without intravenous contrast.

## 2024-10-30 NOTE — PHARMACY-VANCOMYCIN DOSING SERVICE
"Pharmacy Vancomycin Note  Date of Service 2024  Patient's  1965   59 year old, male    Indication: Skin and Soft Tissue Infection  Day of Therapy: 2  Current vancomycin regimen:  1750mg once  Current vancomycin monitoring method: Renal Replacement Therapy  Current vancomycin therapeutic monitoring goal: 10-15 mg/L      Current estimated CrCl = Estimated Creatinine Clearance: 14.6 mL/min (A) (based on SCr of 6.15 mg/dL (H)).    Creatinine for last 3 days  10/29/2024:  4:00 PM Creatinine 5.17 mg/dL  10/30/2024:  6:31 AM Creatinine 6.15 mg/dL    Recent Vancomycin Levels (past 3 days)  10/30/2024:  6:31 AM Vancomycin 16.3 ug/mL    Vancomycin IV Administrations (past 72 hours)                     vancomycin (VANCOCIN) 1,750 mg in 0.9% NaCl 517.5 mL intermittent infusion (mg) 1,750 mg New Bag 10/29/24 1854                    Nephrotoxins and other renal medications (From now, onward)      Start     Dose/Rate Route Frequency Ordered Stop    10/30/24 1900  vancomycin (VANCOCIN) 1,250 mg in 0.9% NaCl 262.5 mL intermittent infusion         1,250 mg  over 90 Minutes Intravenous ONCE 10/30/24 1157      10/30/24 0600  piperacillin-tazobactam (ZOSYN) 3.375 g vial to attach to  mL bag        Note to Pharmacy: For [facility], [facility] and [facility]: For Zosyn-naive patients, use the \"Zosyn initial dose + extended infusion\" order panel.    3.375 g  over 240 Minutes Intravenous EVERY 12 HOURS 10/29/24 2037      10/29/24 2038  vancomycin place harley - receiving intermittent dosing         1 each Intravenous SEE ADMIN INSTRUCTIONS 10/29/24 2038                 Contrast Orders - past 72 hours (72h ago, onward)      Start     Dose/Rate Route Frequency Stop    10/30/24 0930  iopamidol (ISOVUE-370) solution 100 mL         100 mL Intravenous ONCE 10/30/24 0905            Interpretation of levels and current regimen:  Vancomycin level is reflective of therapeutic level    Has serum creatinine changed greater than 50% in last 72 " hours: No    Urine output:  unable to determine    Renal Function: ESRD on Dialysis        Plan:  1250 mg IV once today after dialysis  Vancomycin monitoring method: Renal Replacement Therapy  Vancomycin therapeutic monitoring goal: 10-15 mg/L  Pharmacy will check vancomycin levels as appropriate in 1-3 Days.  Serum creatinine levels will be ordered daily for the first week of therapy and at least twice weekly for subsequent weeks.    Marquise Araya RP

## 2024-10-30 NOTE — PROGRESS NOTES
HEMODIALYSIS TREATMENT NOTE    Date: 10/30/2024  Time: 2:12 PM     Writer came in to ED07 to run his hemodialysis treatment but patient was all dressed up ready to go AMA. Attending Physician encouraged him to stay and get dialyzed while here but he elected to leave against medical advice and go to a different hospital, [place]. Patient has the capacity to make medical decisions. Nephrology provider is updated on patient's decision.

## 2024-10-31 LAB
HBV CORE AB SERPL QL IA: NONREACTIVE
HBV SURFACE AB SERPL IA-ACNC: 71.5 M[IU]/ML
HBV SURFACE AB SERPL IA-ACNC: REACTIVE M[IU]/ML
HBV SURFACE AG SERPL QL IA: NONREACTIVE

## 2024-11-03 LAB — BACTERIA BLD CULT: NO GROWTH

## 2024-11-05 NOTE — UTILIZATION REVIEW
Medicare Post Discharge Review:  Admission Status; Secondary Review Determination   Under the authority of the Utilization Management Committee, the utilization review process indicated a secondary review on Emmanuel Barnett. The review outcome is based on review of the medical records, discussions with staff, and applying clinical experience noted on the date of the review.   (x) Inpatient Status Appropriate - This patient's medical care is consistent with medical management for inpatient care and reasonable inpatient medical practice.     RATIONALE FOR DETERMINATION   Emmanuel Barnett is a 59 yr old diabetic with HTN, HLD, HFpEF, Afib, ESRD on HD, PVD, CAD who presented with right foot necrosis.  Concern for infection and on IV abx zosyn/vanco and podiatry planning amputation procedure however exact location pending ongoing evaluation with vascular surgery.  Patient was awaiting vascular consultation when he decided to leave AMA.     At the time of admission with the information available to the attending physician more than 2 nights Hospital complex care was anticipated, based on patient risk of adverse outcome if treated as outpatient and complex care required. Inpatient admission is appropriate based on the Medicare guidelines.   The information on this document is developed by the utilization review team in order for the business office to ensure compliance. This only denotes the appropriateness of proper admission status and does not reflect the quality of care rendered.   The definitions of Inpatient Status and Observation Status used in making the determination above are those provided in the CMS Coverage Manual, Chapter 1 and Chapter 6, section 70.4.   Sincerely,   Jemma Lundberg MD  Utilization Review  Physician Advisor  [facility]

## 2024-11-14 ENCOUNTER — LAB REQUISITION (OUTPATIENT)
Dept: LAB | Facility: CLINIC | Age: 59
End: 2024-11-14
Payer: MEDICARE

## 2024-11-14 DIAGNOSIS — D64.9 ANEMIA, UNSPECIFIED: ICD-10-CM

## 2024-11-15 ENCOUNTER — DOCUMENTATION ONLY (OUTPATIENT)
Dept: GERIATRICS | Facility: CLINIC | Age: 59
End: 2024-11-15
Payer: MEDICARE

## 2024-11-19 ENCOUNTER — TELEPHONE (OUTPATIENT)
Dept: GERIATRICS | Facility: CLINIC | Age: 59
End: 2024-11-19
Payer: MEDICARE

## 2024-11-19 LAB — HGB BLD-MCNC: 6.6 G/DL (ref 13.3–17.7)

## 2024-11-19 PROCEDURE — P9603 ONE-WAY ALLOW PRORATED MILES: HCPCS | Performed by: FAMILY MEDICINE

## 2024-11-19 PROCEDURE — 36415 COLL VENOUS BLD VENIPUNCTURE: CPT | Performed by: FAMILY MEDICINE

## 2024-11-19 PROCEDURE — 85018 HEMOGLOBIN: CPT | Performed by: FAMILY MEDICINE

## 2024-11-19 NOTE — TELEPHONE ENCOUNTER
"[facility] Triage Call    Provider: Erinn Su MD  Facility: [facility]  Facility Type:  TCU    Caller: [place]  Call Back Number: [phone]    Allergies:  No Known Allergies     SBAR:     S-(situation): Nurse called to report critical Hgb level of 6.6.    B-(background): Patient is a Dialysis patient.  Did receive 2 units of PRBC while hospitalized.  Goes to Dialysis on M/W/F.      A-(assessment): VS: 136/75, 52, 16, 97.2, and O2 96% on RA.  Patient states he is tired.  No s/s of bleeding noted.      R-(recommendations): Send to ER?       Telephone encounter sent to:  FORREST Shrestha CNP    Please send response/orders to \"Geriatrics Nurse Pool\"    Mya Lisa RN      "

## 2025-04-09 ENCOUNTER — TELEPHONE (OUTPATIENT)
Dept: TRANSPLANT | Facility: CLINIC | Age: 60
End: 2025-04-09
Payer: MEDICARE

## 2025-04-09 NOTE — TELEPHONE ENCOUNTER
Patient Call: General  Route to LPN    Reason for call: Patient called to touch base about transplant status and were they stand on list.     Call back needed? Yes    Return Call Needed  Same as documented in contacts section  When to return call?: Greater than one day: Route standard priority

## 2025-04-15 ENCOUNTER — TELEPHONE (OUTPATIENT)
Dept: TRANSPLANT | Facility: CLINIC | Age: 60
End: 2025-04-15
Payer: MEDICARE

## 2025-04-15 ENCOUNTER — DOCUMENTATION ONLY (OUTPATIENT)
Dept: TRANSPLANT | Facility: CLINIC | Age: 60
End: 2025-04-15
Payer: MEDICARE

## 2025-04-15 DIAGNOSIS — E11.9 DIABETES MELLITUS, TYPE 2 (H): Primary | ICD-10-CM

## 2025-04-15 DIAGNOSIS — I10 HYPERTENSION: ICD-10-CM

## 2025-04-15 DIAGNOSIS — N18.6 ESRD (END STAGE RENAL DISEASE) (H): ICD-10-CM

## 2025-04-15 DIAGNOSIS — Z76.82 ORGAN TRANSPLANT CANDIDATE: ICD-10-CM

## 2025-04-15 NOTE — CONFIDENTIAL NOTE
Jailene Barrios, EL Byrd, Madison KOEHLER, EL Wallace, He had a PVR done on 10/27/23 with a volume of 68 ml. He didn't have a urine culture done then, but there is one from 12/6/23 that showed only urogenital deb. The appointment on 10/27/23 was the last with urology.      Thank you,  Jailene Barrios RN, BSN  Urology Triage Nurse

## 2025-04-15 NOTE — TELEPHONE ENCOUNTER
Returned call to Emmanuel and reviewed what is needed to complete his evaluation. We have not seen him since 11/2022. He is agreeable to [facility] appointments. In the interim he has had bilateral BKA and is working with PT and obtaining prosthetics. He feels he is doing well. He is scheduled for cardiology follow up though [facility] 4/29/2025. He saw urology for bladder wall thickening and he was to have a UA/UC and PVR that did not happen. He states he makes very little urine. Will message urology pool.He had his upper teeth removed and some on the bottom. He is working [facility] [phone] and plans to have the remaining teeth extracted next week. The plan will be dentures through [facility]. He was encouraged to set up a [employer] account with us and was given the help desk phone number. He will be scheduled for the virtual MTP class on 4/24/2025 as well as sent the website for watching the videos online. If he chooses to watch the videos he is aware he will need to call Emma to review. He has her number. Will try to track down his latest colonoscopy from [facility].Still looking for records on his SDH from 2022. He said care was at [facility] in [place]. He said it is not [facility] but [facility]. There is not an [facility] hospital in [place]. There is an admission note at [facility] from 2/25/2022 in [place]. He fell on the ice and had a SDH. There are entries in [place] with phone encounters from neurosurgery GARCIA but no documentation in these encounters. There is imagining with brain MRI from 6/2/2024.  Pulmonary saw him 12/2023 for his PFT's and cleared for transplant. It was recommended and ordered for a prone CT scan but has been since cancelled with unknown reason. Orders routed to scheduling.

## 2025-04-23 ENCOUNTER — TELEPHONE (OUTPATIENT)
Dept: TRANSPLANT | Facility: CLINIC | Age: 60
End: 2025-04-23
Payer: MEDICARE

## 2025-04-23 NOTE — TELEPHONE ENCOUNTER
Spoke to Emmanuel and he needed the Help Desk phone number to set up his [employer] account. He is scheduled for the virtual [facility] class tomorrow 4/24

## 2025-04-23 NOTE — TELEPHONE ENCOUNTER
Pt trying to get ready for his video appt tomorrow- Has problems with getting into his [employer]- claims he was sent to [employer] and they sent him back to us  Wants tomorrow's visit changed to phone  Pt needs a call back

## 2025-04-27 ENCOUNTER — HEALTH MAINTENANCE LETTER (OUTPATIENT)
Age: 60
End: 2025-04-27

## 2025-06-02 NOTE — PROGRESS NOTES
TRANSPLANT NEPHROLOGY WAITLIST VISIT    Assessment and Plan:    Recommendations:  - Updated iliac US and non contrast CT a/p.   - Return to clinic after completing work with PT for prosthetic for Frailty assessment. Will need to be able to walk 1 block with prosthetics prior to transplant.  - Return to clinic with support person for SW visit.   - Cardiac risk assessment: last Cardiology visit 12/2022. EF now 35-40%. Discuss whether he needs to continue on Plavix- patient unsure if he is currently taking.   - Parathyroidectomy recommended by Nephrologist per patient. Referred to Endocrinology by Dr. Olsen in May. Has not yet been seen.   - Plan to pull remaining lower teeth then eventually dentures. Continue to follow with Dentist.    - Regarding calciphylaxis: right BKA wounds will need to be completely healed and he will need to be off STS infusions with quiescence before he is able to proceed with transplant.     # Kidney/Pancreas Transplant Wait List Evaluation: Patient is a fair candidate overall. Patient should remain inactive on the wait list until he has completed the above recommendations.    # ESKD from diabetes mellitus type 2 and hypertension: Doing OK on dialysis since 2/2019, but would likely benefit from a kidney transplant.    # Diabetes mellitus type II: Now using CGM with blood sugars running 100-180s. Currently on aspart 10 tid with meals and degludec 8 units AM and 8 units PM. Hgb A1c 13.1%. Repeat pending today. His cardiac risk is prohibitive for pancreas transplant.     # Cardiac Risk: significant cardiac risks including CAD, PAD, obesity, DM, CKD. echo 1/24/25: EF 35-40% and grade 2 diastolic dysfunction, moderate mitral valve regurgitation, aortic valve sclerosis, mild calcification of mitral annulus, moderate mitral valve regurgitation, trivial tricuspid regurgitation.  # NSTEMI: with complete occlusion of LCx s/p PCI x1 in 2017; PCI x3 in March 2019, x2 in April 2019. Follows with  Cardiology.  # CAD: s/p PCI in 2017, 2019, and 2021. Currently on ASA 81 daily. He is unsure if he is currently on plavix.  # PAD: s/p left and right BKA. CT with contrast a/p 10/20/24, non contrast CT in 2023, and iliac US 6/27/23 available for review.   # Atrial fibrillation: not currently on anticoagulation.   # Hypotension: Currently taking midodrine 10 mg on dialysis days. SBP at dialysis usually 110s, can get down to 80s. No hypotension when at home.   # HFrEF: echo 1/24/25: EF 35-40% and grade 2 diastolic dysfunction.  # Aortic valve sclerosis: noted on echo 1/24/25.    # MICHEAL: compliant with CPAP.    # Obesity: unable to assess BMI as patient is double amputee and WC bound. No significant central obesity. Most recent weight 81.6 kg.     # Broken and missing teeth: working with his Dentist with plans to pull additional lower teeth and obtain dentures.    # Former smoker: 25 pack year smoking history, quit in 2017. PFTs 7/2023 showed normal spirometry, subsequent testing 9/2023 showed reduced FEV1/FVC but concurrently reduced FEV1 and FVC independently. Repeat testing 12/6/23 showed normal spirometry with moderate diffusion defect. CT chest 6/27/23 negative for infection or malignancy. Was seen by Pulmonology 12/6/23 and cleared for transplant from a pulmonary standpoint.     # Bilateral BKA: left BKA for osteomyelitis/non healing TMA 4/2023. Right BKA for non healing TMA 1/24/25. Starting to use left prosthetic and will be fit for right prosthetic soon. Currently working with PT. He does have healing wounds on the right stump from calciphylaxis and is receiving IV infusions of STS at dialysis for treatment. PT is monitoring lesions and taking regular photos. Working with [employer] currently.     # Recurrent GI bleeding: No recent issues. History of multiple ED visits and admissions for GI bleeding and anemia. Has bee seen by [facility]: endoscopy performed on March 2023 for melena and was found to have  erythematous mucosa in the antrum and duodenum without any signs of bleeding. He then had a colonoscopy in 2023 that did not show any etiology of anemia. He had a video capsule study done on 2023 but the capsule was retained in the stomach so the study was incomplete. Follows with [facility] as needed for recurrent symptoms.     # Subdural hematoma: Admitted 2022 after a fall resulting in large subdural hematoma with intraparenchymal hemorrhage, s/p left middle meningeal artery embolization 22; had followed with neurosurgery. MRI brain 24: No evidence of acute intracranial hemorrhage, mass effect, or infarction. Did show encephalomalacia within the frontal lobes bilaterally. Last seen by Neurosurgery in  with recommendation to return if symptoms reoccur.     # Calciphylaxis: Patient diagnosed with calciphylaxis of the left BKA s/p debridement in  with no current lesions. Currently has scabbed over lesions on the right BKA. No biopsies to confirm calciphylaxis but did have tissue biopsy of left BKA on 23: Keratin debris with inflamed scale crust and polarizable foreign material; no epidermis identified. He is receiving STS (sodium thiosulfate) infusions at dialysis which were started 2024.     # Hyperparathyroidism: per patient, his Nephrologist is recommending parathyroidectomy. Referred to Endocrinology by Dr. Olsen in May. Has not yet been seen.     # Health Maintenance: Colonoscopy: Up to date and Dental: Up to date     Discussed the risks and benefits of a transplant, including the risk of surgery and immunosuppression medications.    KDPI: We discussed approximate remaining wait time and how that is influenced by issues such as blood type and sensitization (PRA) and access to a living donor. I contrasted potential waiting time for living vs  donor kidneys from  normal (0-85%) or higher (%) kidney donor profile index (KDPI) donors and their associated outcomes.  I would recommend Mr. Barnett to consider kidneys from high KDPI donors. The reason for this decision is best summarized as: decreased dialysis related morbidity/mortality, accepting lower kidney graft survival rates.    Patient presently appears to be enough of an acceptable kidney transplant recipient candidate to have any potential kidney donors start the evaluation process.  Patient s overall re-evaluation may require further discussion in the Transplant Program s multidisciplinary selection committee for a final recommendation on the patient s suitability for transplant.     Reason for Visit:  Emmanuel Barnett is a 59 year old male with ESKD from diabetes mellitus type 2 and hypertension, who presents for kidney and pancreas transplant wait list evaluation.     Date of Initial Transplant Evaluation:  11/17/22        Current Transplant Phase: Evaluation: Active  Official UNOS Listing Date:    Blood Type: A POS        cPRA: 0       Date of cPRA: 11/17/22  Transplant Coordinator: Emma Morgan Transplant Office phone number [phone]     Previous Medical Issues:  # Depression: mentions of SI while hospitalized. No current SI. Currently on lexapro.   # Diabetic neuropathy: on pregabalin.   # Hyperlipidemia: currently on atorvastatin 80 mg daily.      History of Present Illness:   Emmanuel Barnett is a 59 year old male with ESRD secondary to DM type II and hypertension.          Interim Events:     1/21-1/28/25: admission for diabetic foot ulcer after seeing Podiatry OP. Xray concerning for osteomyelitis. He underwent right BKA on 1/24/25. initially treated with Zosyn, vancomycin, and micafungin while awaiting cultures. Meropenem and metronidazole added 1/23 due to fever. Patient later narrowed to doxycycline x5 days on 1/25 following source control with R BKA. Wound cultures positive for MRSA and mixed deb (no beta-Strep, Strep. Pneumoniae, or Pseudomonas aeruginosa). Blood cultures negative. He was admitted to  [facility] for inpatient rehab and discharged on 2/7.     12/24-12/26/24: admission for diarrhea. C diff and enteric panel negative. Diarrhea resolved and he was discharged back to TCU.     11/27-12/16/24: admission for necrosis of amputation stump. He was not taking home medications x 2 days due to weakness and fatigue. Given antibiotics. Ankle LOUIS notable for severe stenosis at right popliteal artery. BKA recommended but patient preferred limb-salvage strategy. He went for debridement and grafting of the right foot stump with podiatry on 12/6/24. He completed antimicrobials Levaquin and micafungin 12/16 and was discharged to a TCU. He also endorsed SI during his hospital stay and Psychiatry adjusted medications.     11/26/24: ED for melena and missed HD on Monday. ED work up unremarkable- iFOBT was negative and hemoglobin stable. Discharged home.     11/19/24: admission for necrotic gangrene of the right foot wound. Hemoglobin 6.6 and he was transfused. Podiatry consulted and he was treated with antibiotics. Discharged home as he refused TCU.     10/29-11/14/24: admission for gangrene of the right foot. Found to have dry gangrene and severe peripheral arterial disease with cellulitis. MRI without obvious osteomyelitis. He was started on broad-spectrum antibiotics, seen by podiatry and vascular surgery. CTA showed occlusion of right distal SFA and severe stenosis and occlusion of posterior tibial artery. Patient underwent right lower extremity angiogram with balloon angioplasty of the popliteal and peroneal arteries on 11/1. Following procedure he underwent right foot modified amputation. Cultures from foot swab 10/31 grew Klebsiella pneumonia, E. coli, MSSA and Enterococcus.     3/27/24- 4/1/24: admitted for GI bleeding. Reported melena x 1 week. Hgb was 6.9. He required transfusions. GI evaluated patient, push enteroscopy performed and revealed an erythematous duodenum, no active bleeding. He was also given cefepime  for UTI.     2/23/24: ED visit for hand pain/blister from injury. Discharged on Keflex and Bactrim for cellulitis.    9/8/23: ED visit for low hemoglobin of 3 while at dialysis. Reported melena for 4-5 days. Hgb was 7.9 in the ED. He was discharged home without transfusion and referred to [facility].    8/3/23: ED visit for coccyx pain and urinary urgency. Hyperglycemia due to not taking medications. UA consistent with UTI and he was treated with cipro.      4/26/23: admitted for left BKA for non healing TMA.     3/8-3/24/23: admission for melena and anemia with Hgb 6.3. Podiatry also consulted for wet gangrene of left foot and mayorga ulceration s/p TMA in the OR with 3 days of zosyn following. EGD negative. OP follow up colonoscopy which was negative.     2/23/23: hospital for planned lower extremity angiogram, arthrectomy and angioplasty of left popliteal and distal superficial femoral arteries. Patient was placed on aspirin 81 mg daily and ticagrelor 90 mg twice daily.          Kidney Disease: ESRD secondary to presumed HTN and diabetic nephropathy and has been on HD since 2/2019. Anuric- maybe once per week.        Kidney Disease Dx: Diabetes mellitus type 2  and hypertension       On Dialysis: Yes, Date initiated: 2/2019 and Dialysis Type: [facility];       Primary Nephrologist: Dr. Olsen         Diabetes: diagnosed with DM type II ~20 years ago.        Diabetic Control: Poorly controlled (HbA1c >9%)      Last HbA1c: 13%       Hypoglycemic Unawareness: No       New Issues: Yes; eylea injections for retinopathy as needed. Follows with Ophthalmologist Dr. Vang.          Cardiac/Vascular Disease History:       Known CAD: Yes        Last Cardiac Risk Assessment: 2022       Last Cardiac Stress Test/Coronary Angiogram: 2021       Known PAD/Claudication Symptoms: No       Known Heart Failure: HFpEF      Last Cardiac Echo: 1/2025       Arrhythmia:  Yes; AF       Pulmonary Hypertension: No        Valvular Disease: Yes;  moderate mitral valve regurgitation, aortic valve sclerosis, mild calcification of mitral annulus, moderate mitral valve regurgitation, trivial tricuspid regurgitation.       Other: Hypotension on midodrine during dialysis        New Cardiac/Vascular Events: No         Functional Capacity/Frailty:        Independent with ADLs but does require assistance with laundry and vacuuming. Friends assist with needs. Uses power Guide and has been working with PT to use his prosthetics for BKA. Unable to walk with prosthetics at this time.     # COVID Vaccination Up To Date: Not asked    Other Pertinent Transplant Surgical Issues:  Recent Blood Transfusion: No  Previous Abdominal Transplant: No  Bladder Dysfunction: No  Chronic/Recurrent Infections: No  Chronic Anticoagulation: No  Jehovah s Witness: No       Active Problem List:   Patient Active Problem List   Diagnosis    Type 2 Diabetes Mellitus    Peripheral Neuropathy    ESRD (end stage renal disease) (H)    History of tobacco use    Essential hypertension    Right foot infection    Diabetes mellitus, type 2 (H)    Organ transplant candidate       Personal History:  Currently lives alone in [place], [place]. No drug or alcohol use. Daughter plans to be post transplant support person and has additional support through neighbors.     Allergies:  No Known Allergies     Medications:  Current Outpatient Medications   Medication Sig Dispense Refill    aspirin 81 mg chewable tablet Take 81 mg by mouth daily.      atorvastatin (LIPITOR) 80 MG tablet Take 80 mg by mouth daily.      escitalopram (LEXAPRO) 20 MG tablet Take 20 mg by mouth every morning.      insulin aspart (NOVOLOG PEN) 100 UNIT/ML pen Take 1 unit per 5 grams of carbs with meals (if carb count unknown take 15 units)      insulin degludec (TRESIBA FLEXTOUCH) 100 UNIT/ML pen Inject 50 Units Subcutaneous      metoprolol succinate ER (TOPROL XL) 25 MG 24 hr tablet Take 25 mg by mouth daily.      midodrine (PROAMATINE) 5 MG  tablet Take 5 mg by mouth See Admin Instructions. Take one tablet by mouth before dialysis      nitroGLYcerin (NITROSTAT) 0.4 MG sublingual tablet Place 0.4 mg under the tongue      pregabalin (LYRICA) 75 MG capsule Take 75 mg by mouth 2 times daily.      sevelamer carbonate (RENVELA) 800 MG tablet Take 2,400 mg by mouth 3 times daily (with meals).      traZODone (DESYREL) 150 MG tablet Take 300 mg by mouth at bedtime.       No current facility-administered medications for this visit.        Vitals:  Vitals   /76   Pulse 66   SpO2 99 %   Weight -- [patient has no legs or prosthics, cant do ht or wt]        Exam:  GENERAL APPEARANCE: alert and no distress  HENT: mouth without ulcers or lesions; poor dentition and multiple missing teeth   LYMPHATICS: no cervical or supraclavicular nodes  RESP: lungs clear to auscultation - no rales, rhonchi or wheezes  CV: regular rhythm, normal rate, no rub, no murmur  EDEMA: no LE edema bilaterally  ABDOMEN: soft, nondistended, nontender, bowel sounds normal  MS: bilateral BKA and bilateral hand contractures.   SKIN: multiple scabbed over lesions to the right BKA, no erythema, drainage, or swelling.   DIALYSIS ACCESS:  LUE AV fistula thrill present    I spent a total of 60 minutes face-to-face with or coordinating care of Emmanuel Barnett regarding kidney transplant on the date of the encounter.       FORREST Meyers CNP

## 2025-06-03 ENCOUNTER — OFFICE VISIT (OUTPATIENT)
Dept: TRANSPLANT | Facility: CLINIC | Age: 60
End: 2025-06-03
Attending: NURSE PRACTITIONER
Payer: MEDICARE

## 2025-06-03 ENCOUNTER — LAB (OUTPATIENT)
Dept: LAB | Facility: CLINIC | Age: 60
End: 2025-06-03
Attending: NURSE PRACTITIONER
Payer: COMMERCIAL

## 2025-06-03 VITALS — HEART RATE: 66 BPM | SYSTOLIC BLOOD PRESSURE: 124 MMHG | DIASTOLIC BLOOD PRESSURE: 76 MMHG | OXYGEN SATURATION: 99 %

## 2025-06-03 VITALS — OXYGEN SATURATION: 99 % | SYSTOLIC BLOOD PRESSURE: 124 MMHG | DIASTOLIC BLOOD PRESSURE: 76 MMHG | HEART RATE: 66 BPM

## 2025-06-03 DIAGNOSIS — E11.22 END STAGE RENAL DISEASE ON DIALYSIS DUE TO TYPE 2 DIABETES MELLITUS (H): ICD-10-CM

## 2025-06-03 DIAGNOSIS — N18.6 ESRD (END STAGE RENAL DISEASE) (H): ICD-10-CM

## 2025-06-03 DIAGNOSIS — Z12.5 ENCOUNTER FOR SCREENING FOR MALIGNANT NEOPLASM OF PROSTATE: ICD-10-CM

## 2025-06-03 DIAGNOSIS — Z01.818 PRE-TRANSPLANT EVALUATION FOR KIDNEY TRANSPLANT: Primary | ICD-10-CM

## 2025-06-03 DIAGNOSIS — Z76.82 ORGAN TRANSPLANT CANDIDATE: ICD-10-CM

## 2025-06-03 DIAGNOSIS — I10 HYPERTENSION: ICD-10-CM

## 2025-06-03 DIAGNOSIS — N18.6 END STAGE RENAL DISEASE ON DIALYSIS DUE TO TYPE 2 DIABETES MELLITUS (H): ICD-10-CM

## 2025-06-03 DIAGNOSIS — E11.9 DIABETES MELLITUS, TYPE 2 (H): ICD-10-CM

## 2025-06-03 DIAGNOSIS — I15.1 HYPERTENSION SECONDARY TO OTHER RENAL DISORDERS: ICD-10-CM

## 2025-06-03 DIAGNOSIS — Z99.2 TYPE 2 DIABETES MELLITUS WITH CHRONIC KIDNEY DISEASE ON CHRONIC DIALYSIS, WITH LONG-TERM CURRENT USE OF INSULIN (H): ICD-10-CM

## 2025-06-03 DIAGNOSIS — N18.6 TYPE 2 DIABETES MELLITUS WITH CHRONIC KIDNEY DISEASE ON CHRONIC DIALYSIS, WITH LONG-TERM CURRENT USE OF INSULIN (H): ICD-10-CM

## 2025-06-03 DIAGNOSIS — Z99.2 END STAGE RENAL DISEASE ON DIALYSIS DUE TO TYPE 2 DIABETES MELLITUS (H): ICD-10-CM

## 2025-06-03 DIAGNOSIS — Z76.82 ORGAN TRANSPLANT CANDIDATE: Primary | ICD-10-CM

## 2025-06-03 DIAGNOSIS — E11.22 TYPE 2 DIABETES MELLITUS WITH CHRONIC KIDNEY DISEASE ON CHRONIC DIALYSIS, WITH LONG-TERM CURRENT USE OF INSULIN (H): ICD-10-CM

## 2025-06-03 DIAGNOSIS — Z79.4 TYPE 2 DIABETES MELLITUS WITH CHRONIC KIDNEY DISEASE ON CHRONIC DIALYSIS, WITH LONG-TERM CURRENT USE OF INSULIN (H): ICD-10-CM

## 2025-06-03 LAB
EST. AVERAGE GLUCOSE BLD GHB EST-MCNC: 214 MG/DL
HBA1C MFR BLD: 9.1 %
PSA SERPL DL<=0.01 NG/ML-MCNC: 0.81 NG/ML (ref 0–4.5)

## 2025-06-03 PROCEDURE — 3074F SYST BP LT 130 MM HG: CPT | Performed by: SURGERY

## 2025-06-03 PROCEDURE — G0103 PSA SCREENING: HCPCS | Performed by: PATHOLOGY

## 2025-06-03 PROCEDURE — 86833 HLA CLASS II HIGH DEFIN QUAL: CPT | Performed by: NURSE PRACTITIONER

## 2025-06-03 PROCEDURE — 36415 COLL VENOUS BLD VENIPUNCTURE: CPT | Performed by: PATHOLOGY

## 2025-06-03 PROCEDURE — G0463 HOSPITAL OUTPT CLINIC VISIT: HCPCS | Performed by: NURSE PRACTITIONER

## 2025-06-03 PROCEDURE — 86832 HLA CLASS I HIGH DEFIN QUAL: CPT | Performed by: NURSE PRACTITIONER

## 2025-06-03 PROCEDURE — 99214 OFFICE O/P EST MOD 30 MIN: CPT | Performed by: SURGERY

## 2025-06-03 PROCEDURE — 99000 SPECIMEN HANDLING OFFICE-LAB: CPT | Performed by: PATHOLOGY

## 2025-06-03 PROCEDURE — 3078F DIAST BP <80 MM HG: CPT | Performed by: SURGERY

## 2025-06-03 PROCEDURE — 83036 HEMOGLOBIN GLYCOSYLATED A1C: CPT | Performed by: NURSE PRACTITIONER

## 2025-06-03 PROCEDURE — G0463 HOSPITAL OUTPT CLINIC VISIT: HCPCS | Performed by: SURGERY

## 2025-06-03 RX ORDER — HYDROCHLOROTHIAZIDE 12.5 MG/1
CAPSULE ORAL
COMMUNITY
Start: 2024-10-14

## 2025-06-03 RX ORDER — HYDROCHLOROTHIAZIDE 12.5 MG/1
CAPSULE ORAL
COMMUNITY
Start: 2025-03-06

## 2025-06-03 RX ORDER — CLOPIDOGREL BISULFATE 75 MG/1
75 TABLET ORAL
COMMUNITY
Start: 2024-11-15

## 2025-06-03 RX ORDER — KETOROLAC TROMETHAMINE 30 MG/ML
INJECTION, SOLUTION INTRAMUSCULAR; INTRAVENOUS
COMMUNITY
Start: 2024-09-17

## 2025-06-03 NOTE — Clinical Note
6/3/2025      Emmanuel Barnett  [address] [address]      Dear Colleague,    Thank you for referring your patient, Emmanuel Barnett, to the [facility]. Please see a copy of my visit note below.    No notes on file    Again, thank you for allowing me to participate in the care of your patient.        Sincerely,        Chanel Sterling MD, MD    Electronically signed

## 2025-06-03 NOTE — NURSING NOTE
"Chief Complaint   Patient presents with    Transplant Waitlist Maintenance     Kidney transplant waitlist     Vital signs:      BP: 124/76 Pulse: 66     SpO2: 99 %          Estimated body mass index is 26.58 kg/m  as calculated from the following:    Height as of 10/29/24: 1.753 m (5' 9\").    Weight as of 10/29/24: 81.6 kg (180 lb).      Spencer Lopez RN on 6/3/2025 at 10:16 AM    "

## 2025-06-03 NOTE — PATIENT INSTRUCTIONS
- Your coordinator will contact you with further recommendations.  - Continue to work with physical therapy after getting both prosthetics. You will need to walk at least 1 block prior to transplant.   - You were referred to Endocrinology by Dr. Olsen for possible parathyroid surgery. Please schedule this visit.  - Continue to work with your Dentist to pull remaining teeth.

## 2025-06-03 NOTE — LETTER
6/3/2025      Emmanuel Barnett  [address] [facility] [address]      Dear Colleague,    Thank you for referring your patient, Emmanuel Barnett, to the [facility]. Please see a copy of my visit note below.    TRANSPLANT NEPHROLOGY WAITLIST VISIT    Assessment and Plan:    Recommendations:  - Updated iliac US and non contrast CT a/p.   - Return to clinic after completing work with PT for prosthetic for Frailty assessment. Will need to be able to walk 1 block with prosthetics prior to transplant.  - Return to clinic with support person for SW visit.   - Cardiac risk assessment: last Cardiology visit 12/2022. EF now 35-40%. Discuss whether he needs to continue on Plavix- patient unsure if he is currently taking.   - Parathyroidectomy recommended by Nephrologist per patient. Referred to Endocrinology by Dr. Olsen in May. Has not yet been seen.   - Plan to pull remaining lower teeth then eventually dentures. Continue to follow with Dentist.    - Regarding calciphylaxis: right BKA wounds will need to be completely healed and he will need to be off STS infusions with quiescence before he is able to proceed with transplant.     # Kidney/Pancreas Transplant Wait List Evaluation: Patient is a fair candidate overall. Patient should remain inactive on the wait list until he has completed the above recommendations.    # ESKD from diabetes mellitus type 2 and hypertension: Doing OK on dialysis since 2/2019, but would likely benefit from a kidney transplant.    # Diabetes mellitus type II: Now using CGM with blood sugars running 100-180s. Currently on aspart 10 tid with meals and degludec 8 units AM and 8 units PM. Hgb A1c 13.1%. Repeat pending today. His cardiac risk is prohibitive for pancreas transplant.     # Cardiac Risk: significant cardiac risks including CAD, PAD, obesity, DM, CKD. echo 1/24/25: EF 35-40% and grade 2 diastolic dysfunction, moderate mitral valve regurgitation, aortic valve  sclerosis, mild calcification of mitral annulus, moderate mitral valve regurgitation, trivial tricuspid regurgitation.  # NSTEMI: with complete occlusion of LCx s/p PCI x1 in 2017; PCI x3 in March 2019, x2 in April 2019. Follows with Cardiology.  # CAD: s/p PCI in 2017, 2019, and 2021. Currently on ASA 81 daily. He is unsure if he is currently on plavix.  # PAD: s/p left and right BKA. CT with contrast a/p 10/20/24, non contrast CT in 2023, and iliac US 6/27/23 available for review.   # Atrial fibrillation: not currently on anticoagulation.   # Hypotension: Currently taking midodrine 10 mg on dialysis days. SBP at dialysis usually 110s, can get down to 80s. No hypotension when at home.   # HFrEF: echo 1/24/25: EF 35-40% and grade 2 diastolic dysfunction.  # Aortic valve sclerosis: noted on echo 1/24/25.    # MICHEAL: compliant with CPAP.    # Obesity: unable to assess BMI as patient is double amputee and WC bound. No significant central obesity. Most recent weight 81.6 kg.     # Broken and missing teeth: working with his Dentist with plans to pull additional lower teeth and obtain dentures.    # Former smoker: 25 pack year smoking history, quit in 2017. PFTs 7/2023 showed normal spirometry, subsequent testing 9/2023 showed reduced FEV1/FVC but concurrently reduced FEV1 and FVC independently. Repeat testing 12/6/23 showed normal spirometry with moderate diffusion defect. CT chest 6/27/23 negative for infection or malignancy. Was seen by Pulmonology 12/6/23 and cleared for transplant from a pulmonary standpoint.     # Bilateral BKA: left BKA for osteomyelitis/non healing TMA 4/2023. Right BKA for non healing TMA 1/24/25. Starting to use left prosthetic and will be fit for right prosthetic soon. Currently working with PT. He does have healing wounds on the right stump from calciphylaxis and is receiving IV infusions of STS at dialysis for treatment. PT is monitoring lesions and taking regular photos. Working with [facility] currently.     # Recurrent GI bleeding: No recent issues. History of multiple ED visits and admissions for GI bleeding and anemia. Has bee seen by [facility]: endoscopy performed on March 2023 for melena and was found to have erythematous mucosa in the antrum and duodenum without any signs of bleeding. He then had a colonoscopy in November 2023 that did not show any etiology of anemia. He had a video capsule study done on 12/6/2023 but the capsule was retained in the stomach so the study was incomplete. Follows with [facility] as needed for recurrent symptoms.     # Subdural hematoma: Admitted 2/2022 after a fall resulting in large subdural hematoma with intraparenchymal hemorrhage, s/p left middle meningeal artery embolization 4/7/22; had followed with neurosurgery. MRI brain 5/31/24: No evidence of acute intracranial hemorrhage, mass effect, or infarction. Did show encephalomalacia within the frontal lobes bilaterally. Last seen by Neurosurgery in 2022 with recommendation to return if symptoms reoccur.     # Calciphylaxis: Patient diagnosed with calciphylaxis of the left BKA s/p debridement in 2024 with no current lesions. Currently has scabbed over lesions on the right BKA. No biopsies to confirm calciphylaxis but did have tissue biopsy of left BKA on 7/28/23: Keratin debris with inflamed scale crust and polarizable foreign material; no epidermis identified. He is receiving STS (sodium thiosulfate) infusions at dialysis which were started 1/2024.     # Hyperparathyroidism: per patient, his Nephrologist is recommending parathyroidectomy. Referred to Endocrinology by Dr. Olsen in May. Has not yet been seen.     # Health Maintenance: Colonoscopy: Up to date and Dental: Up to date     Discussed the risks and benefits of a transplant, including the risk of surgery and immunosuppression medications.    KDPI: We discussed approximate remaining wait time and how that is influenced by issues such as blood type and  sensitization (PRA) and access to a living donor. I contrasted potential waiting time for living vs  donor kidneys from  normal (0-85%) or higher (%) kidney donor profile index (KDPI) donors and their associated outcomes. I would recommend Mr. Barnett to consider kidneys from high KDPI donors. The reason for this decision is best summarized as: decreased dialysis related morbidity/mortality, accepting lower kidney graft survival rates.    Patient presently appears to be enough of an acceptable kidney transplant recipient candidate to have any potential kidney donors start the evaluation process.  Patient s overall re-evaluation may require further discussion in the Transplant Program s multidisciplinary selection committee for a final recommendation on the patient s suitability for transplant.     Reason for Visit:  Emmanuel Barnett is a 59 year old male with ESKD from diabetes mellitus type 2 and hypertension, who presents for kidney and pancreas transplant wait list evaluation.     Date of Initial Transplant Evaluation:  22        Current Transplant Phase: Evaluation: Active  Official UNOS Listing Date:    Blood Type: A POS        cPRA: 0       Date of cPRA: 22  Transplant Coordinator: Emma Morgan Transplant Office phone number [phone]     Previous Medical Issues:  # Depression: mentions of SI while hospitalized. No current SI. Currently on lexapro.   # Diabetic neuropathy: on pregabalin.   # Hyperlipidemia: currently on atorvastatin 80 mg daily.      History of Present Illness:   Emmanuel Barnett is a 59 year old male with ESRD secondary to DM type II and hypertension.          Interim Events:     -25: admission for diabetic foot ulcer after seeing Podiatry OP. Xray concerning for osteomyelitis. He underwent right BKA on 25. initially treated with Zosyn, vancomycin, and micafungin while awaiting cultures. Meropenem and metronidazole added  due to fever. Patient  later narrowed to doxycycline x5 days on 1/25 following source control with R BKA. Wound cultures positive for MRSA and mixed deb (no beta-Strep, Strep. Pneumoniae, or Pseudomonas aeruginosa). Blood cultures negative. He was admitted to [facility] for inpatient rehab and discharged on 2/7.     12/24-12/26/24: admission for diarrhea. C diff and enteric panel negative. Diarrhea resolved and he was discharged back to TCU.     11/27-12/16/24: admission for necrosis of amputation stump. He was not taking home medications x 2 days due to weakness and fatigue. Given antibiotics. Ankle LOUIS notable for severe stenosis at right popliteal artery. BKA recommended but patient preferred limb-salvage strategy. He went for debridement and grafting of the right foot stump with podiatry on 12/6/24. He completed antimicrobials Levaquin and micafungin 12/16 and was discharged to a TCU. He also endorsed SI during his hospital stay and Psychiatry adjusted medications.     11/26/24: ED for melena and missed HD on Monday. ED work up unremarkable- iFOBT was negative and hemoglobin stable. Discharged home.     11/19/24: admission for necrotic gangrene of the right foot wound. Hemoglobin 6.6 and he was transfused. Podiatry consulted and he was treated with antibiotics. Discharged home as he refused TCU.     10/29-11/14/24: admission for gangrene of the right foot. Found to have dry gangrene and severe peripheral arterial disease with cellulitis. MRI without obvious osteomyelitis. He was started on broad-spectrum antibiotics, seen by podiatry and vascular surgery. CTA showed occlusion of right distal SFA and severe stenosis and occlusion of posterior tibial artery. Patient underwent right lower extremity angiogram with balloon angioplasty of the popliteal and peroneal arteries on 11/1. Following procedure he underwent right foot modified amputation. Cultures from foot swab 10/31 grew Klebsiella pneumonia, E. coli, MSSA and Enterococcus.      3/27/24- 4/1/24: admitted for GI bleeding. Reported melena x 1 week. Hgb was 6.9. He required transfusions. GI evaluated patient, push enteroscopy performed and revealed an erythematous duodenum, no active bleeding. He was also given cefepime for UTI.     2/23/24: ED visit for hand pain/blister from injury. Discharged on Keflex and Bactrim for cellulitis.    9/8/23: ED visit for low hemoglobin of 3 while at dialysis. Reported melena for 4-5 days. Hgb was 7.9 in the ED. He was discharged home without transfusion and referred to [place].    8/3/23: ED visit for coccyx pain and urinary urgency. Hyperglycemia due to not taking medications. UA consistent with UTI and he was treated with cipro.      4/26/23: admitted for left BKA for non healing TMA.     3/8-3/24/23: admission for melena and anemia with Hgb 6.3. Podiatry also consulted for wet gangrene of left foot and mayorga ulceration s/p TMA in the OR with 3 days of zosyn following. EGD negative. OP follow up colonoscopy which was negative.     2/23/23: hospital for planned lower extremity angiogram, arthrectomy and angioplasty of left popliteal and distal superficial femoral arteries. Patient was placed on aspirin 81 mg daily and ticagrelor 90 mg twice daily.          Kidney Disease: ESRD secondary to presumed HTN and diabetic nephropathy and has been on HD since 2/2019. Anuric- maybe once per week.        Kidney Disease Dx: Diabetes mellitus type 2  and hypertension       On Dialysis: Yes, Date initiated: 2/2019 and Dialysis Type: [facility] [place] [facility];       Primary Nephrologist: Dr. Olsen         Diabetes: diagnosed with DM type II ~20 years ago.        Diabetic Control: Poorly controlled (HbA1c >9%)      Last HbA1c: 13%       Hypoglycemic Unawareness: No       New Issues: Yes; eylea injections for retinopathy as needed. Follows with Ophthalmologist Dr. Vang.          Cardiac/Vascular Disease History:       Known CAD: Yes        Last Cardiac Risk Assessment: 2022        Last Cardiac Stress Test/Coronary Angiogram: 2021       Known PAD/Claudication Symptoms: No       Known Heart Failure: HFpEF      Last Cardiac Echo: 1/2025       Arrhythmia:  Yes; AF       Pulmonary Hypertension: No        Valvular Disease: Yes; moderate mitral valve regurgitation, aortic valve sclerosis, mild calcification of mitral annulus, moderate mitral valve regurgitation, trivial tricuspid regurgitation.       Other: Hypotension on midodrine during dialysis        New Cardiac/Vascular Events: No         Functional Capacity/Frailty:        Independent with ADLs but does require assistance with laundry and vacuuming. Friends assist with needs. Uses power en[employer]v and has been working with PT to use his prosthetics for BKA. Unable to walk with prosthetics at this time.     # COVID Vaccination Up To Date: Not asked    Other Pertinent Transplant Surgical Issues:  Recent Blood Transfusion: No  Previous Abdominal Transplant: No  Bladder Dysfunction: No  Chronic/Recurrent Infections: No  Chronic Anticoagulation: No  Jehovah s Witness: No       Active Problem List:   Patient Active Problem List   Diagnosis     Type 2 Diabetes Mellitus     Peripheral Neuropathy     ESRD (end stage renal disease) (H)     History of tobacco use     Essential hypertension     Right foot infection     Diabetes mellitus, type 2 (H)     Organ transplant candidate       Personal History:  Currently lives alone in [place], [place]. No drug or alcohol use. Daughter plans to be post transplant support person and has additional support through neighbors.     Allergies:  No Known Allergies     Medications:  Current Outpatient Medications   Medication Sig Dispense Refill     aspirin 81 mg chewable tablet Take 81 mg by mouth daily.       atorvastatin (LIPITOR) 80 MG tablet Take 80 mg by mouth daily.       escitalopram (LEXAPRO) 20 MG tablet Take 20 mg by mouth every morning.       insulin aspart (NOVOLOG PEN) 100 UNIT/ML pen Take 1 unit per 5 grams of carbs  with meals (if carb count unknown take 15 units)       insulin degludec (TRESIBA FLEXTOUCH) 100 UNIT/ML pen Inject 50 Units Subcutaneous       metoprolol succinate ER (TOPROL XL) 25 MG 24 hr tablet Take 25 mg by mouth daily.       midodrine (PROAMATINE) 5 MG tablet Take 5 mg by mouth See Admin Instructions. Take one tablet by mouth before dialysis       nitroGLYcerin (NITROSTAT) 0.4 MG sublingual tablet Place 0.4 mg under the tongue       pregabalin (LYRICA) 75 MG capsule Take 75 mg by mouth 2 times daily.       sevelamer carbonate (RENVELA) 800 MG tablet Take 2,400 mg by mouth 3 times daily (with meals).       traZODone (DESYREL) 150 MG tablet Take 300 mg by mouth at bedtime.       No current facility-administered medications for this visit.        Vitals:  Vitals   /76   Pulse 66   SpO2 99 %   Weight -- [patient has no legs or prosthics, cant do ht or wt]        Exam:  GENERAL APPEARANCE: alert and no distress  HENT: mouth without ulcers or lesions  LYMPHATICS: no cervical or supraclavicular nodes  RESP: lungs clear to auscultation - no rales, rhonchi or wheezes  CV: regular rhythm, normal rate, no rub, no murmur  EDEMA: no LE edema bilaterally  ABDOMEN: soft, nondistended, nontender, bowel sounds normal  MS: extremities normal - no gross deformities noted, no evidence of inflammation in joints, no muscle tenderness  SKIN: no rash  DIALYSIS ACCESS:  LUE AV fistula thrill present    I spent a total of 60 minutes face-to-face with or coordinating care of Emmanuel Barnett regarding kidney transplant on the date of the encounter.       Hermila Lee APRN CNP        Again, thank you for allowing me to participate in the care of your patient.        Sincerely,        Melissa Lewis NP    Electronically signed

## 2025-06-05 ENCOUNTER — RESULTS FOLLOW-UP (OUTPATIENT)
Dept: TRANSPLANT | Facility: CLINIC | Age: 60
End: 2025-06-05

## 2025-06-11 LAB
PROTOCOL CUTOFF: NORMAL
SA 1  COMMENTS: NORMAL
SA 1 CELL: NORMAL
SA 1 TEST METHOD: NORMAL
SA 2 CELL: NORMAL
SA 2 COMMENTS: NORMAL
SA 2 TEST METHOD: NORMAL
SA1 HI RISK ABY: NORMAL
SA1 MOD RISK ABY: NORMAL
SA2 HI RISK ABY: NORMAL
SA2 MOD RISK ABY: NORMAL
UNACCEPTABLE ANTIGENS: NORMAL
UNOS CPRA: 28

## 2025-06-30 NOTE — PROGRESS NOTES
Transplant Surgery Consult Note     Medical record number: [id number]  YOB: 1965,   Consult requested  for evaluation of kidney and pancreas transplant candidacy.    Assessment and Recommendations:Mr. Barnett appears to be a poor candidate for kidney transplantation and has a poor understanding of the risks and benefits of this approach to the management of renal failure. The following issues should be addressed prior to finalizing his transplant candidacy:     Transplant order: Mr. Barnett has End stage renal failure due to diabetes mellitus type 2 whose condition is not expected to resolve, is expected to progress, and is expected to continue to develop related comorbid conditions.  Recommend he be considered as a candidate for kidney and pancreas transplant.  Cardiology consult for cardiac risk stratification to be ordered: Yes  CT abdomen and pelvis without contrast to be ordered for assessment of vascular targets: Yes  Transplant listing labs ordered to include HLA, ABOx2, Cr, etc.  Dietician consult ordered: Yes  Social work consult ordered: Yes  Imaging reports reviewed: CT scan from October 2024  IMPRESSION:  1.  RIGHT LOWER EXTREMITY: Occlusion of the distal SFA. Reconstitution of flow in the popliteal artery, the patent portion of the popliteal artery is severely stenotic. Two-vessel runoff to the foot via the peroneal and posterior tibial arteries. The   posterior tibial artery is severely stenotic versus occluded proximally, the remainder of the vessel is patent..  2.  LEFT LOWER EXTREMITY: Post surgical changes of left below-knee amputation. Left iliac arteries, CFA, and profunda femoral arteries are patent without high-grade stenosis. Occlusion of the distal SFA with bulky calcification. Reconstitution of flow in   the P2 popliteal artery..  3.  Moderate to severe bilateral renal artery stenosis, left worse than right. Mild to moderate bilateral renal parenchymal atrophy.  4.   Hepatosplenomegaly  Radiology images reviewed: CT scan images from  and 2024 reviewed.  He does appear to possibly have some narrowing of the common iliac secondary to atherosclerotic disease.  He has scattered calcifications of the bilateral external iliac arteries however from the  scan he does appear to have targets.  Right is probably better than left.  Would redo scan given his disease and the fact that he is on dialysis since 2019  Recipient suitable to move forward with work up of living donors:  No  He has several items to complete prior to being suitable for listing  I do not think he would be a good pancreas candidate given his severe coronary artery disease and decreased heart function.  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  These would need to be completely healed and the calciphylaxis to be quiescent off the sodium thiosulfate infusions which he is still currently on  His last hemoglobin A1c was uncontrolled at 13.1% and this would need to be better controlled.  He is currently having hypotension during dialysis and takes midodrine but denies hypotension at home  He has bilateral BKA's but is not mobile with his prosthetics yet.  He would need to undergo more physical therapy and become mobile with his prosthetics  Hypercalcemia: His nephrologist is recommending parathyroidectomy which she has not completed yet    The majority of our visit was spent in counselling, discussing the medical and surgical risks of kidney transplantation. We discussed approximate wait time and how that is influenced by issues such as blood type and sensitization (PRA) and access to a living donor. I contrasted potential waiting time for living vs  donor kidneys from  normal (0-85%) or higher (%) kidney donor profile index (KDPI)  donors and their associated outcomes. I would recommend this individual to consider kidneys from high KDPI donors. The reason for this decision is best summarized as: decreased dialysis related morbidity/mortality, accepting lower kidney graft survival rates. Potential surgical complications of kidney transplantation include bleeding, superficial or deep wound complications (infection, hernia, lymphocele), ureteral anastomotic failure (leak or stenosis), graft thrombosis, need for reoperation and other issues such as cardiac complications, pneumonia, deep venous thrombosis, pulmonary embolism, post transplant diabetes and death. The potential for recurrent disease or need for retransplantation was also addressed. We discussed the possible need for ureteral stent (and subsequent removal), and the utility of protocol biopsy and laboratory studies to evaluate for rejection or recurrent disease. We discussed the risk of graft rejection, our center's average graft and patient survival rates, immunosuppression protocols, as well as the potential opportunity to participate in clinical trials.  We also discussed the average length of stay, recovery process, and posttransplant lab and monitoring protocol.  I emphasized the need for strict immunosuppression medication adherence and the potential for complications of immunosuppression such as skin cancer or lymphoma, as well as a very low but not zero risk of donor-derived disease transmission risks (infection, cancer). Mr. Barnett asked good questions and his candidacy will be reviewed at our Multidisciplinary Selection Committee. Thank you for the opportunity to participate in Mr. Barnett's care.      Total time: 60 minutes        Chanel Sterling MD FACS  Associate Professor of Surgery  Director, Living Kidney Donor Program.    ---------------------------------------------------------------------------------------------------    HPI: Mr. Barnett has End stage renal  failure due to diabetes mellitus type 2.  He currently uses a continuous glucose monitor to monitor his sugars and states that his glucoses run between 100 and 180.  He currently takes aspart 10 units 3 times a day with meals and degludec 8 units in the morning and 8 units at night.  His last hemoglobin A1c was uncontrolled at 13.1%    The patient is on dialysis.    Has potential kidney donors:  No.  Interested in participation in paired exchange if a donor is willing: Doesn't know     The patient has the following pertinent history:       No    Yes  Dialysis:    []      [x] via:       Blood Transfusion                  []      [x]  Number of units:   Most recently:  Pregnancy:    [x]      [] Number:       Previous Transplant:  [x]      [] Details:    Cancer    [x]      [] Comment:   Kidney stones   [x]      [] Comment:      Recurrent infections  []      [x]  Type: Foot ulcers status post BKA                 Bladder dysfunction  [x]      [] Cause:    Claudication   [x]      [] Distance: Unknown since he does not walk  Previous Amputation  []      [x] Cause:   Bilateral BKA  Chronic anticoagulation  []      [x] Indication: Clopidogrel  Taoist  No      Past Medical History:   Diagnosis Date    CAD (coronary artery disease)     Calciphylaxis     Diabetes (H) Type 2     ESRD (end stage renal disease) on dialysis (H)     Former smoker     25 year pack history, quit 2017    GI bleed     Heart failure with reduced ejection fraction (H)     Hypertension     NSTEMI (non-ST elevated myocardial infarction) (H)     2019    Obesity     MICHEAL (obstructive sleep apnea)     PAD (peripheral artery disease)     S/P BKA (below knee amputation) (H)     bilateral    Secondary renal hyperparathyroidism     Subdural hematoma (H)     from fall in 2022     Past Surgical History:   Procedure Laterality Date    AMPUTATION BELOW KNEE RT/LT Left 2023    AMPUTATION BELOW KNEE RT/LT Right 10/2024    CARDIAC SURGERY  2021    stents     No  family history on file.  Social History     Socioeconomic History    Marital status:      Spouse name: Not on file    Number of children: Not on file    Years of education: Not on file    Highest education level: Not on file   Occupational History    Not on file   Tobacco Use    Smoking status: Former     Current packs/day: 0.00     Average packs/day: 1.5 packs/day for 15.0 years (22.5 ttl pk-yrs)     Types: Cigarettes     Start date:      Quit date: 2017     Years since quittin.4    Smokeless tobacco: Never   Substance and Sexual Activity    Alcohol use: Not Currently    Drug use: Never    Sexual activity: Not on file   Other Topics Concern    Not on file   Social History Narrative    Not on file     Social Drivers of Health     Financial Resource Strain: Low Risk  (3/27/2024)    [facility]    Financial Resource Strain     Difficulty of Paying Living Expenses: 3     Difficulty of Paying Living Expenses: Not on file   Recent Concern: Financial Resource Strain - Medium Risk (2024)    [facility]    Financial Resource Strain     Difficulty of Paying Living Expenses: 2     Difficulty of Paying Living Expenses: 1   Food Insecurity: No Food Insecurity (2025)    [facility]    Food Insecurity     Do you worry your food will run out before you are able to buy more?: 1   Transportation Needs: No Transportation Needs (2025)    [facility]    Transportation Needs     Does lack of transportation keep you from medical appointments?: 1     Does lack of transportation keep you from work, meetings or getting things that you need?: 1   Physical Activity: Not on file   Stress: Not on file   Social Connections: Socially Isolated (2025)    [facility]    Social  Connections     Do you often feel lonely or isolated from those around you?: 4   Interpersonal Safety: Not on file   Housing Stability: Low Risk  (1/28/2025)    Received from [employer] [facility]    Housing Stability     What is your housing situation today?: 1       ROS:   CONSTITUTIONAL:  No fevers or chills  EYES: negative for icterus  ENT:  negative for hearing loss, tinnitus and sore throat  RESPIRATORY:  negative for cough, sputum, dyspnea  CARDIOVASCULAR:  negative for chest pain Fatigue  GASTROINTESTINAL:  negative for nausea, vomiting, diarrhea or constipation  GENITOURINARY:  negative for incontinence, dysuria, bladder emptying problems  HEME:  No easy bruising  INTEGUMENT:  negative for rash and pruritus  NEURO:  Negative for headache, seizure disorder  Allergies:   No Known Allergies  Medications:  Prescription Medications as of 6/30/2025         Rx Number Disp Refills Start End Last Dispensed Date Next Fill Date Owning Pharmacy    aspirin 81 mg chewable tablet  -- -- 12/17/2014 --       Sig: Take 81 mg by mouth daily.    Class: Historical    Route: Oral    atorvastatin (LIPITOR) 80 MG tablet  -- -- 4/18/2024 --       Sig: Take 80 mg by mouth daily.    Class: Historical    Route: Oral    clopidogrel (PLAVIX) 75 MG tablet  -- -- 11/15/2024 --       Sig: Take 75 mg by mouth.    Class: Historical    Route: Oral    Continuous Glucose  (FREESTYLE EDWARD 3 READER) EMMY  -- -- 9/17/2024 --       Class: Historical    Continuous Glucose Sensor (FREESTYLE EDWARD 3 PLUS SENSOR) MISC  -- -- 10/14/2024 --       Class: Historical    Continuous Glucose Sensor (FREESTYLE EDWARD 3 PLUS SENSOR) [facility] [place]  -- -- 3/6/2025 --       Class: Historical    escitalopram (LEXAPRO) 20 MG tablet  -- -- 6/14/2024 --       Sig: Take 20 mg by mouth every morning.    Class: Historical    Route: Oral    insulin aspart (NOVOLOG PEN) 100 UNIT/ML pen  -- -- 5/22/2021 --       Sig: Take 1 unit per 5 grams of carbs  with meals (if carb count unknown take 15 units)    Class: Historical    insulin degludec (TRESIBA FLEXTOUCH) 100 UNIT/ML pen  -- -- 4/23/2021 --       Sig: Inject 50 Units Subcutaneous    Class: Historical    Route: Subcutaneous    midodrine (PROAMATINE) 5 MG tablet  -- -- 4/1/2024 --       Sig: Take 5 mg by mouth See Admin Instructions. Take one tablet by mouth before dialysis    Class: Historical    Route: Oral    nitroGLYcerin (NITROSTAT) 0.4 MG sublingual tablet  -- -- 6/8/2022 --       Sig: Place 0.4 mg under the tongue    Class: Historical    Route: Sublingual    PERMETHRIN EX  -- -- 5/28/2025 --       Sig: BIPAP () machine for home use at pressure: PS: 4 max IPAP: 25, min Epap 16. Choice of mask ( or ) w/full face cushion () x1/mo, nasal cushion () x2/mo, or nasal pillows () x 2/mo; Length of Need: 99 months; Frequency of use: Daily    Class: Historical    pregabalin (LYRICA) 75 MG capsule  -- -- 1/25/2024 --       Sig: Take 75 mg by mouth 2 times daily.    Class: Historical    Route: Oral    sevelamer carbonate (RENVELA) 800 MG tablet  -- -- 3/27/2024 --       Sig: Take 2,400 mg by mouth 3 times daily (with meals).    Class: Historical    Route: Oral    traZODone (DESYREL) 150 MG tablet  -- -- 4/17/2024 --       Sig: Take 300 mg by mouth at bedtime.    Class: Historical    Route: Oral          Exam:     /76   Pulse 66   SpO2 99%   Appearance: in no apparent distress.   Skin: normal.  Does have scabbed over wounds on his stump  Eyes:  no redness or discharge.  Sclera anicteric  Head and Neck: Normal, no rashes or jaundice  Respiratory: easy respirations, no audible wheezing.  Abdomen: rounded and protuberant, No distention and No surgical scars   Extremities: femoral 1+/1+, Edema, none  Neuro: without deficit   Psychiatric: Normal mood and affect    Diagnostics:   Recent Results (from the past 4 weeks)   Hemoglobin A1c    Collection Time: 06/03/25 10:15 AM   Result Value  Ref Range    Estimated Average Glucose 214 (H) <117 mg/dL    Hemoglobin A1C 9.1 (H) <5.7 %   Prostate Specific Antigen Screen    Collection Time: 06/03/25 10:15 AM   Result Value Ref Range    Prostate Specific Antigen Screen 0.81 0.00 - 4.50 ng/mL   HLA Jerrod Class II, Single Antigen    Collection Time: 06/03/25 10:15 AM   Result Value Ref Range    SA 2 TEST METHOD SA EDTA FCS     SA 2 CELL Class II     SA2 HI RISK JERROD None     SA2 MOD RISK JERROD [id number]     SA 2 COMMENTS        HLA PRA Test performed by modified testing procedure that may also include pretreatment of serum. Pretreatment may be the addition of fetal calf serum, EDTA, and/or adsorption.  High-risk, MFI > 3,000.  Mod-risk, -3,000.   HLA Jerrod Class I, Single Antigen    Collection Time: 06/03/25 10:15 AM   Result Value Ref Range    SA 1 TEST METHOD SA EDTA FCS     SA 1 CELL Class I     SA1 HI RISK JERROD None     SA1 MOD RISK JERROD A:80     SA 1  COMMENTS        HLA PRA Test performed by modified testing procedure that may also include pretreatment of serum. Pretreatment may be the addition of fetal calf serum, EDTA, and/or adsorption.  High-risk, MFI > 3,000.  Mod-risk, -3,000.   HLA Jerrod, CPRA    Collection Time: 06/03/25 10:15 AM   Result Value Ref Range    PROTOCOL CUTOFF Plan A, 500 mfi cumulative     UNOS CPRA 28     UNACCEPTABLE ANTIGENS A:80 DR:4      UNOS CPRA   Date Value Ref Range Status   06/03/2025 28  Final